# Patient Record
Sex: FEMALE | Race: BLACK OR AFRICAN AMERICAN | NOT HISPANIC OR LATINO | URBAN - METROPOLITAN AREA
[De-identification: names, ages, dates, MRNs, and addresses within clinical notes are randomized per-mention and may not be internally consistent; named-entity substitution may affect disease eponyms.]

---

## 2017-10-13 ENCOUNTER — EMERGENCY (EMERGENCY)
Facility: HOSPITAL | Age: 57
LOS: 1 days | End: 2017-10-13
Payer: COMMERCIAL

## 2017-10-13 PROCEDURE — 71020: CPT | Mod: 26

## 2017-10-13 PROCEDURE — 99285 EMERGENCY DEPT VISIT HI MDM: CPT

## 2017-10-13 PROCEDURE — 71275 CT ANGIOGRAPHY CHEST: CPT | Mod: 26

## 2017-10-14 ENCOUNTER — OUTPATIENT (OUTPATIENT)
Dept: OUTPATIENT SERVICES | Facility: HOSPITAL | Age: 57
LOS: 1 days | End: 2017-10-14

## 2017-11-14 ENCOUNTER — EMERGENCY (EMERGENCY)
Facility: HOSPITAL | Age: 57
LOS: 1 days | End: 2017-11-14
Payer: COMMERCIAL

## 2017-11-14 PROCEDURE — 74177 CT ABD & PELVIS W/CONTRAST: CPT | Mod: 26

## 2017-11-14 PROCEDURE — 99285 EMERGENCY DEPT VISIT HI MDM: CPT

## 2018-06-22 ENCOUNTER — APPOINTMENT (OUTPATIENT)
Dept: ORTHOPEDIC SURGERY | Facility: CLINIC | Age: 58
End: 2018-06-22
Payer: MEDICARE

## 2018-06-22 VITALS
SYSTOLIC BLOOD PRESSURE: 125 MMHG | WEIGHT: 258 LBS | HEART RATE: 73 BPM | DIASTOLIC BLOOD PRESSURE: 74 MMHG | BODY MASS INDEX: 38.21 KG/M2 | HEIGHT: 69 IN

## 2018-06-22 PROCEDURE — 73502 X-RAY EXAM HIP UNI 2-3 VIEWS: CPT

## 2018-06-22 PROCEDURE — 99203 OFFICE O/P NEW LOW 30 MIN: CPT

## 2018-06-22 PROCEDURE — 99214 OFFICE O/P EST MOD 30 MIN: CPT

## 2018-08-05 ENCOUNTER — TRANSCRIPTION ENCOUNTER (OUTPATIENT)
Age: 58
End: 2018-08-05

## 2019-01-17 ENCOUNTER — APPOINTMENT (OUTPATIENT)
Dept: NEUROSURGERY | Facility: CLINIC | Age: 59
End: 2019-01-17
Payer: COMMERCIAL

## 2019-01-17 VITALS
BODY MASS INDEX: 33.33 KG/M2 | WEIGHT: 225 LBS | DIASTOLIC BLOOD PRESSURE: 75 MMHG | HEIGHT: 69 IN | HEART RATE: 99 BPM | SYSTOLIC BLOOD PRESSURE: 142 MMHG

## 2019-01-17 PROCEDURE — 99204 OFFICE O/P NEW MOD 45 MIN: CPT

## 2019-01-17 NOTE — PLAN
[FreeTextEntry1] : DIAGNOSIS:  CERVICAL SPONDYLOSIS with MYELOPATHY \par TREATMENT PLAN:  NON-SURGICAL   VS.   ACDF  C3-6\par \par This is a patient with cervical spondylosis and stenosis.  I have recommended nonsurgical management at this time.  This consists of physical therapy and/or manual medicine in conjunction to medical therapy and other conservative methods.  These include the consideration of trigger point injections and or the utilization of modalities such as TENS where applicable.  The next tier would be the referral to a pain management specialist (anesthesia or physiatry) for the consideration of spinal injections.  This includes the options of epidural steroids, facet injections as well as other novel techniques that may provide pain relief.  Although there is indeed evidence of disk degeneration on imaging, discectomy or spinal fusion for the sole purposes of treating neck pain in the absence of a compressive lesion or neurological issue is associated with poor outcomes.   \par \par If all  nonsurgical methods fail, and/or the patient develops neurologic issues then, I have recommended cervical decompression and fusion as a treatment option.  This entails removing the disk, osteophytes and the ventral uncovertebral joints along with the underlying hypertrophied/ossified posterior longitudinal ligamentum.  This will allow for a widening of the spinal canal and the neuroforamen at the effected levels.  In doing so this will likely result in added instability to the spine at this level requiring the need for instrumented stabilization and fusion.  This implies the use of anterior plate fixation and interbody prosthetics to provide strength and anatomical alignment to the operated segments.  \par \par Risks and benefits of surgery were described to the patient in detail which include but not excluding those otherwise not mentioned:  coma paralysis, death, stroke, spinal fluid leak, nerve injury, weakness, infection, hardware malfunction/failure/mal-positioning requiring re-operation, vascular injury, nonunion/pseudoarthrosis, adjacent segment degeneration, dysphonia/dysphagia and persistent pain.\par \par I have reviewed the images in detail with the patient today in my office and have answered all questions regarding this condition to the best of my ability to the patient’s satisfaction.\par \par \par Need bone density\par \par Lumbar MRI\par \par

## 2019-01-17 NOTE — RESULTS/DATA
[FreeTextEntry1] : Mitali MRI reveals severe stenosis at C3-4 with spinal cord signal changes at 3445 also 6 with stenosis at all these levels is a bulging disc at C6-7.  X-rays in thre is subluxation which is  a little hypermobile but not unstable in flexion extension views.

## 2019-01-17 NOTE — HISTORY OF PRESENT ILLNESS
[de-identified] : Jessica is a pleasant 58-year-old female for evaluation of her cervical spine today. She is a history of rheumatoid arthritis has been on steroids for many years. She has history of multiple orthopedic surgeries to the hips and knees. She been using a walker for several years now.  She now has numbness and tingling in her hands and weakness in her arms. She has weakness in her legs as per her report. 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  She states that she gets cysts and all of her joints and had apparently some sort of the lumbar systems per history years ago the cause for her left leg weakness and never fully return to normal. She did not have surgery at that time. She doesn't complaining of severe neck pain and diminished range of motion. She has bursitis of the reminiscent of myelopathy. Her neurologist recommended a cervical spine MRI and she's here for review of this.

## 2019-01-17 NOTE — PHYSICAL EXAM
[FreeTextEntry6] : She has trace proximal weakness in the upper tremor is but has good  strength. She has numbness and tingling down the arms bilaterally. Motor examination was notable for some weakness in the left leg compared the right with mild atrophy of the quadriceps. She's got trace weakness of dorsiflexion in the left leg.

## 2019-02-03 ENCOUNTER — EMERGENCY (EMERGENCY)
Facility: HOSPITAL | Age: 59
LOS: 1 days | End: 2019-02-03
Payer: COMMERCIAL

## 2019-02-03 PROCEDURE — 70450 CT HEAD/BRAIN W/O DYE: CPT | Mod: 26

## 2019-02-03 PROCEDURE — 72125 CT NECK SPINE W/O DYE: CPT | Mod: 26

## 2019-02-03 PROCEDURE — 73030 X-RAY EXAM OF SHOULDER: CPT | Mod: 26,RT

## 2019-02-03 PROCEDURE — 99284 EMERGENCY DEPT VISIT MOD MDM: CPT

## 2019-02-06 ENCOUNTER — APPOINTMENT (OUTPATIENT)
Dept: NEUROSURGERY | Facility: CLINIC | Age: 59
End: 2019-02-06
Payer: COMMERCIAL

## 2019-02-06 VITALS — WEIGHT: 225 LBS | BODY MASS INDEX: 34.1 KG/M2 | HEIGHT: 68 IN

## 2019-02-06 VITALS — SYSTOLIC BLOOD PRESSURE: 138 MMHG | DIASTOLIC BLOOD PRESSURE: 80 MMHG

## 2019-02-06 DIAGNOSIS — M48.02 SPINAL STENOSIS, CERVICAL REGION: ICD-10-CM

## 2019-02-06 PROCEDURE — 99214 OFFICE O/P EST MOD 30 MIN: CPT

## 2019-02-06 NOTE — REASON FOR VISIT
[Follow-Up: _____] : a [unfilled] follow-up visit [FreeTextEntry1] : Mrs. Oconnor presents to the office today in follow-up after she was seen in the ED at [facility] on 2/3 after she sustained a mechanical slip and fall down several stairs and striking her head on a metal bar at home. A CT scan of her cervical spine was performed which revealed multi-level degenerative disc disease, diffuse spondylosis with severe central canal stenosis at C4-5, C5-6 and C6-7. A CT scan of her brain was performed which was negative for ICH and she was discharged home as having had a concussion. She is currently under the care of Dr. Wallace for pain management and notes that he decreased the dose of her Fentanyl patch and oxycodone. She is currently taking oxycodone 20mg five times daily.

## 2019-02-06 NOTE — RESULTS/DATA
[FreeTextEntry1] : CT c-spine personally reviewed. It reveals an anterolisthesis at C3-4 with resulting central canal stenosis. There is diffuse spondylosis and multi-level degenerative disc disease most notable at C4-5 and C5-6. There is significant central canal stenosis at the aforementioned levels. \par \par Compared images to most recent MRI which did not suggest an acute injury/new findings.

## 2019-02-13 RX ORDER — DICLOFENAC SODIUM 10 MG/G
1 GEL TOPICAL
Qty: 1 | Refills: 1 | Status: ACTIVE | COMMUNITY
Start: 2019-02-13 | End: 1900-01-01

## 2019-03-04 ENCOUNTER — APPOINTMENT (OUTPATIENT)
Dept: NEUROSURGERY | Facility: CLINIC | Age: 59
End: 2019-03-04
Payer: MEDICARE

## 2019-03-04 VITALS — WEIGHT: 225 LBS | HEIGHT: 68 IN | BODY MASS INDEX: 34.1 KG/M2

## 2019-03-04 VITALS — SYSTOLIC BLOOD PRESSURE: 122 MMHG | DIASTOLIC BLOOD PRESSURE: 70 MMHG

## 2019-03-04 DIAGNOSIS — Z91.81 HISTORY OF FALLING: ICD-10-CM

## 2019-03-04 PROCEDURE — 99214 OFFICE O/P EST MOD 30 MIN: CPT

## 2019-03-04 NOTE — RESULTS/DATA
[FreeTextEntry1] : ZP  MRI of the Cervical spine is again reviewed. It shows rather severe cervical stenosis from C3-C6 with spinal cord signal changes at the upper level. C3-4 is the most stenotic level. Lumbar spine imaging reveals L2-3 L3-4 stenosis with a mild spondylolisthesis noted.

## 2019-03-04 NOTE — ASSESSMENT
[FreeTextEntry1] : DIAGNOSIS:  CERVICAL STENOSIS MYELOPATHY\par TREATMENT PLAN:  CERVICAL  DECOMPRESSION AND FUSION  C3-6\par \par This is a patient with cervical spondylosis and stenosis.  I have recommended cervical decompression and fusion as a treatment option.  This entails removing the disk, osteophytes and the ventral uncovertebral joints along with the underlying hypertrophied/ossified posterior longitudinal ligamentum.  This will allow for a widening of the spinal canal and the neuroforamen at the effected levels.  In doing so this will likely result in added instability to the spine at this level requiring the need for instrumented stabilization and fusion.  This implies the use of anterior plate fixation and interbody prosthetics to provide strength and anatomical alignment to the operated segments.  \par \par Risks and benefits of surgery were described to the patient in detail which include but not excluding those otherwise not mentioned:  coma paralysis, death, stroke, spinal fluid leak, nerve injury, weakness, infection, hardware malfunction/failure/mal-positioning requiring re-operation, vascular injury, nonunion/pseudoarthrosis, adjacent segment degeneration, dysphonia/dysphagia and persistent pain.\par \par Prior to surgery she needs a full medical clearance I think vascular studies and lower extremities would be appropriate.\par \par In reference to her lumbar spine she does indeed have lumbar stenosis may be a candidate for surgical intervention but the cervical spine is the priority at this point.\par

## 2019-03-04 NOTE — REASON FOR VISIT
[Follow-Up: _____] : a [unfilled] follow-up visit [FreeTextEntry1] : \par \par Shasta is here for followup regarding her cervical spine primarily. She is imaging also her lumbar spine. She symptoms suggestive of a myeloradiculopathy. She has severe neck and arm pain with numbness and tingling in hands. She states she took a fall recently, it seemed to result in worsening arm pain and numbness and tingling bilaterally. In speaking with her she also states that she has a feeling of coldness in her feet as well as numbness and tingling.  She walks with the aid of a walker. She has bilateral knee replacements.

## 2019-03-19 ENCOUNTER — OUTPATIENT (OUTPATIENT)
Dept: OUTPATIENT SERVICES | Facility: HOSPITAL | Age: 59
LOS: 1 days | End: 2019-03-19

## 2019-03-28 ENCOUNTER — INPATIENT (INPATIENT)
Facility: HOSPITAL | Age: 59
LOS: 0 days | Discharge: HOME | End: 2019-03-29
Attending: NEUROLOGICAL SURGERY | Admitting: NEUROLOGICAL SURGERY
Payer: MEDICARE

## 2019-03-28 ENCOUNTER — APPOINTMENT (OUTPATIENT)
Dept: NEUROSURGERY | Facility: HOSPITAL | Age: 59
End: 2019-03-28
Payer: MEDICARE

## 2019-03-28 ENCOUNTER — OUTPATIENT (OUTPATIENT)
Dept: OUTPATIENT SERVICES | Facility: HOSPITAL | Age: 59
LOS: 1 days | End: 2019-03-28

## 2019-03-28 PROCEDURE — 22554 ARTHRD ANT NTRBD MIN DSC CRV: CPT

## 2019-03-28 PROCEDURE — 22585 ARTHRD ANT NTRBD MIN DSC EA: CPT

## 2019-03-28 PROCEDURE — 63082 REMOVE VERTEBRAL BODY ADD-ON: CPT

## 2019-03-28 PROCEDURE — 22853 INSJ BIOMECHANICAL DEVICE: CPT | Mod: 59

## 2019-03-28 PROCEDURE — 63081 REMOVE VERT BODY DCMPRN CRVL: CPT | Mod: 22

## 2019-03-28 PROCEDURE — 22846 INSERT SPINE FIXATION DEVICE: CPT | Mod: 59

## 2019-03-28 PROCEDURE — 22551 ARTHRD ANT NTRBDY CERVICAL: CPT

## 2019-03-28 PROCEDURE — 72040 X-RAY EXAM NECK SPINE 2-3 VW: CPT | Mod: 26

## 2019-03-28 PROCEDURE — 22552 ARTHRD ANT NTRBD CERVICAL EA: CPT

## 2019-03-30 ENCOUNTER — MOBILE ON CALL (OUTPATIENT)
Age: 59
End: 2019-03-30

## 2019-04-01 ENCOUNTER — APPOINTMENT (OUTPATIENT)
Dept: NEUROSURGERY | Facility: CLINIC | Age: 59
End: 2019-04-01
Payer: MEDICARE

## 2019-04-01 ENCOUNTER — EMERGENCY (EMERGENCY)
Facility: HOSPITAL | Age: 59
LOS: 1 days | End: 2019-04-01
Admitting: EMERGENCY MEDICINE
Payer: MEDICARE

## 2019-04-01 VITALS — BODY MASS INDEX: 34.1 KG/M2 | HEIGHT: 68 IN | WEIGHT: 225 LBS

## 2019-04-01 PROCEDURE — 99283 EMERGENCY DEPT VISIT LOW MDM: CPT

## 2019-04-01 PROCEDURE — 99024 POSTOP FOLLOW-UP VISIT: CPT

## 2019-04-01 RX ORDER — OXYCODONE AND ACETAMINOPHEN 10; 325 MG/1; MG/1
10-325 TABLET ORAL
Qty: 42 | Refills: 0 | Status: ACTIVE | COMMUNITY
Start: 2019-04-01 | End: 1900-01-01

## 2019-04-01 NOTE — REASON FOR VISIT
[de-identified] : Anterior cervical decompression C3-C6 [de-identified] : 3/28/19 [de-identified] : Patient is in a lot of pain- she says her shoulders are in severe pain along with her back.

## 2019-04-01 NOTE — ASSESSMENT
[FreeTextEntry1] : Mrs. Oconnor is 4 days post-op from an anterior cervical decompression and fusion C3-6. She is complaining of severe right sided neck and arm pain. She has been taking the Percocet 20mg tablets, 2 every 4 hours or so, with little relief. We advised her that we can only prescribe the Pecocet 10mg tablets for a week at a time and she should follow-up with Dr. Wallace should she require more pain medication. I did advise her that she should follow-up with a pain management specialist to work on tapering down her medication. We will follow-up with her in a week to check her wound and evaluate her progress.

## 2019-04-01 NOTE — PHYSICAL EXAM
[General Appearance - Alert] : alert [General Appearance - In No Acute Distress] : in no acute distress [General Appearance - Well Nourished] : well nourished [General Appearance - Well Developed] : well developed [General Appearance - Well-Appearing] : healthy appearing [] : normal voice and communication [Transverse] : transverse [Clean] : clean [Dry] : dry [Intact] : intact [Abnormal Walk] : normal gait [Limited Balance] : the patient's balance was impaired [FreeTextEntry1] : anterior neck  [FreeTextEntry6] : + strength intact bilaterally

## 2019-04-09 ENCOUNTER — APPOINTMENT (OUTPATIENT)
Dept: NEUROSURGERY | Facility: CLINIC | Age: 59
End: 2019-04-09
Payer: MEDICARE

## 2019-04-09 VITALS
SYSTOLIC BLOOD PRESSURE: 165 MMHG | HEART RATE: 100 BPM | BODY MASS INDEX: 36.37 KG/M2 | DIASTOLIC BLOOD PRESSURE: 94 MMHG | WEIGHT: 240 LBS | HEIGHT: 68 IN

## 2019-04-09 PROCEDURE — 99024 POSTOP FOLLOW-UP VISIT: CPT

## 2019-04-09 NOTE — HISTORY OF PRESENT ILLNESS
[FreeTextEntry1] : Mrs. Oconnor presents to the office today 11 days post-op from a C3-C6 ACDF. She does report some improvement in the strength and sensation in her hands bilaterally. She is still experiencing pain in the back of her neck and to the right shoulder/arm. She is still ambulating with the walker. Her incision has healed beautifully without evidence of infection. She is still taking Oxycodone 30mg 5 times daily.

## 2019-04-09 NOTE — ASSESSMENT
[FreeTextEntry1] : Mrs. Oconnor is now 11 days post-op from a three level anterior cervical decompression and fusion. She should work on increasing her activity as tolerated. I've provided her a script for PT to work on strengthening her upper extremities and work on her range of motion. Her incision is healing well, but I've advised her not to submerge the wound in water or apply any ointment to the wound until it has completely healed. She may continue with ice to the neck for the swelling and heat to the back of the neck to help with muscle spasms. She should follow-up with Dr. Wallace regarding her pain medication as she should work on tapering down the medication. She is to follow-up in the office in about a month and at that time we will obtain a set of cervical xrays.

## 2019-04-09 NOTE — REASON FOR VISIT
[de-identified] : post Anterior cervical decompression C3-C6 [de-identified] : 3/28/19 [de-identified] : Patient is here for follow up - back of neck pain and severe pain in the side of her neck. She has numbness in her legs and feet.

## 2019-04-19 ENCOUNTER — APPOINTMENT (OUTPATIENT)
Dept: NEUROSURGERY | Facility: CLINIC | Age: 59
End: 2019-04-19
Payer: MEDICARE

## 2019-04-19 VITALS
SYSTOLIC BLOOD PRESSURE: 152 MMHG | WEIGHT: 240 LBS | DIASTOLIC BLOOD PRESSURE: 82 MMHG | BODY MASS INDEX: 36.37 KG/M2 | HEIGHT: 68 IN

## 2019-04-19 PROCEDURE — 99024 POSTOP FOLLOW-UP VISIT: CPT

## 2019-04-19 RX ORDER — CYCLOBENZAPRINE HYDROCHLORIDE 10 MG/1
10 TABLET, FILM COATED ORAL TWICE DAILY
Qty: 40 | Refills: 0 | Status: ACTIVE | COMMUNITY
Start: 2019-04-19 | End: 1900-01-01

## 2019-04-19 NOTE — DATA REVIEWED
[de-identified] : cervical xrays performed in the office today reveal interbody spacers in place at C3-4, C4-5 and C5-6 with evidence of early fusion in the spaces. There is an atlantis plate atop with screws in the vertebral bodies, good alignment.

## 2019-04-19 NOTE — DATA REVIEWED
[de-identified] : cervical xrays performed in the office today reveal interbody spacers in place at C3-4, C4-5 and C5-6 with evidence of early fusion in the spaces. There is an atlantis plate atop with screws in the vertebral bodies, good alignment.

## 2019-04-19 NOTE — HISTORY OF PRESENT ILLNESS
[FreeTextEntry1] : Mrs. Oconnor presents to the office today three weeks status post C3-6 anterior cervical decompression fusion. She is still complaining of pain in her right arm, but does report improvement in sensation in her bilateral upper and lower extremities. The strength in her arms has also improved significantly. She is walking with a rollator, but does report improvement in her gait and balance. She is still taking fentanyl 75mcg every 12 hours and Percocet 30mg 4 times daily. \par

## 2019-04-19 NOTE — REASON FOR VISIT
[Follow-Up: _____] : a [unfilled] follow-up visit [FreeTextEntry1] : having a lot of pain  [de-identified] : 3/28/2019 [de-identified] : Anterior cervical decompression fusion C3-6

## 2019-04-19 NOTE — PHYSICAL EXAM
[General Appearance - Alert] : alert [General Appearance - In No Acute Distress] : in no acute distress [General Appearance - Well Nourished] : well nourished [General Appearance - Well Developed] : well developed [General Appearance - Well-Appearing] : healthy appearing [] : normal voice and communication [Transverse] : transverse [Clean] : clean [Dry] : dry [Well-Healed] : well-healed [Intact] : intact [No Drainage] : without drainage [Person] : oriented to person [Time] : oriented to time [Place] : oriented to place [Involuntary Movements] : no involuntary movements were seen [Motor Tone] : muscle tone was normal in all four extremities [No Muscle Atrophy] : normal bulk in all four extremities [Motor Handedness Right-Handed] : the patient is right hand dominant [Abnormal Walk] : normal gait [Limited Balance] : the patient's balance was impaired [4] : 4/5 Finger Flexors (C8) [5] : 5/5 Finger Flexors (C8) [FreeTextEntry1] : anterior neck

## 2019-04-19 NOTE — ASSESSMENT
[FreeTextEntry1] : Mrs. Oconnor is three weeks status post C3-6 ACDF, and although she is experiencing a lot of post surgical pain, her gait and strength seemed to have improved significantly. I've recommended massage therapy to help alleviate the tension in the back of her neck. I've also recommended applying heat and biofreeze to the neck. She should continue with PT to work on her balance and strengthening her arms. She has some weakness to her right shoulder, which is likely due to her RA. I've also referred her to a pain management specialist to better manage her pain. We will have her back in the office in three months for another set of cervical xrays.

## 2019-04-19 NOTE — REASON FOR VISIT
[Follow-Up: _____] : a [unfilled] follow-up visit [FreeTextEntry1] : having a lot of pain  [de-identified] : Anterior cervical decompression fusion C3-6 [de-identified] : 3/28/2019

## 2019-04-19 NOTE — PHYSICAL EXAM
[General Appearance - Alert] : alert [General Appearance - In No Acute Distress] : in no acute distress [General Appearance - Well Nourished] : well nourished [General Appearance - Well Developed] : well developed [General Appearance - Well-Appearing] : healthy appearing [] : normal voice and communication [Transverse] : transverse [Clean] : clean [Dry] : dry [Intact] : intact [Well-Healed] : well-healed [No Drainage] : without drainage [Person] : oriented to person [Time] : oriented to time [Place] : oriented to place [Involuntary Movements] : no involuntary movements were seen [Motor Tone] : muscle tone was normal in all four extremities [Motor Handedness Right-Handed] : the patient is right hand dominant [No Muscle Atrophy] : normal bulk in all four extremities [Limited Balance] : the patient's balance was impaired [Abnormal Walk] : normal gait [4] : 4/5 Finger Flexors (C8) [5] : 5/5 Elbow Extensor (C7) [FreeTextEntry1] : anterior neck

## 2019-04-28 ENCOUNTER — OUTPATIENT (OUTPATIENT)
Dept: OUTPATIENT SERVICES | Facility: HOSPITAL | Age: 59
LOS: 1 days | End: 2019-04-28

## 2019-05-17 ENCOUNTER — APPOINTMENT (OUTPATIENT)
Age: 59
End: 2019-05-17
Payer: MEDICARE

## 2019-05-17 VITALS
WEIGHT: 240 LBS | DIASTOLIC BLOOD PRESSURE: 85 MMHG | HEIGHT: 68 IN | BODY MASS INDEX: 36.37 KG/M2 | SYSTOLIC BLOOD PRESSURE: 152 MMHG | HEART RATE: 90 BPM

## 2019-05-17 PROCEDURE — 99024 POSTOP FOLLOW-UP VISIT: CPT

## 2019-05-17 NOTE — PHYSICAL EXAM
[General Appearance - Alert] : alert [General Appearance - Well Nourished] : well nourished [General Appearance - In No Acute Distress] : in no acute distress [General Appearance - Well-Appearing] : healthy appearing [General Appearance - Well Developed] : well developed [] : normal voice and communication [Transverse] : transverse [Clean] : clean [Dry] : dry [Well-Healed] : well-healed [Intact] : intact [Person] : oriented to person [Time] : oriented to time [Place] : oriented to place [Motor Tone] : muscle tone was normal in all four extremities [Motor Strength] : muscle strength was normal in all four extremities [Involuntary Movements] : no involuntary movements were seen [No Muscle Atrophy] : normal bulk in all four extremities [Motor Handedness Right-Handed] : the patient is right hand dominant [Abnormal Walk] : normal gait [Limited Balance] : the patient's balance was impaired [Able to heel walk] : the patient was able to heel walk [Normal] : normal [Able to toe walk] : the patient was able to toe walk [5] : 5/5 Elbow Extensor (C7) [3] : 3/5 Knee Extensor (L3) [4] : 4/5 Great Toe Extension (L5) [FreeTextEntry8] : walks well without any assistance [FreeTextEntry1] : anterior neck

## 2019-05-17 NOTE — ASSESSMENT
[FreeTextEntry1] : Mrs. Oconnor is two months status post C3-C6 anterior cervical decompression and fusion. She is doing extremely well and is to continue to increase her activity as tolerated. I;ve advised her to start ambulating without the walker and use the cane for assistance. I've also suggested she use ankle weights to work on strengthening her legs while walking and doing exercises at home. She really should work on tapering down this medication at this post, down to oxycodone 30mg TID. We will have her back in the office in three months to repeat the xrays and discuss the merits of a possible lumbar fusion surgery.

## 2019-05-17 NOTE — HISTORY OF PRESENT ILLNESS
[FreeTextEntry1] : Mrs. Oconnor presents to the office today almost two months status post ACDF C3-6. Her surgical pain has improved significantly and is only experiencing some pain in the back of her head. She is moving around much better and has started to use the cane particularly at home and with short distances. She reports increased sensation to her upper extremities bilaterally. She does report heaviness to her legs and back pain and would like to revisit the lumbar spine MRI at some point. \par She saw Dr. Wallace last week who increased her oxycodone from 20mg 5 times daily to 30mg QID. She is still on fentanyl 75mcg.

## 2019-05-17 NOTE — DATA REVIEWED
[de-identified] : Reviewed previous lumbar spine MRI  from  (2/19): L3-4 slight grade 1 anterolisthesis and broad based disc bulge and bilateral facet arthropathy resulting in moderate to severe central canal stenosis. At L5-S1 there is a broad based disc bulge with bilateral facet arthropathy resulting in mild central canal and bilateral foraminal stenosis, left greater than right with impingement of the exiting L5 nerve root.

## 2019-05-17 NOTE — REASON FOR VISIT
[de-identified] : status post Anterior cervical decompression fusion C3-6  [de-identified] : 3/28/2019 [de-identified] : she says her pain is better then from before surgery. she is having some back of the head pain but she says that’s from when she hit her head before surgery.

## 2019-05-27 NOTE — REASON FOR VISIT
[New Patient Visit] : a new patient visit [FreeTextEntry1] : Patient is here for lower back-upper back- neck pain- Imaging was done at [facility].  [phone]

## 2019-05-28 ENCOUNTER — APPOINTMENT (OUTPATIENT)
Dept: NEUROSURGERY | Facility: CLINIC | Age: 59
End: 2019-05-28
Payer: MEDICARE

## 2019-05-28 VITALS
DIASTOLIC BLOOD PRESSURE: 82 MMHG | WEIGHT: 240 LBS | BODY MASS INDEX: 36.37 KG/M2 | HEIGHT: 68 IN | HEART RATE: 103 BPM | SYSTOLIC BLOOD PRESSURE: 131 MMHG

## 2019-05-28 PROCEDURE — 99024 POSTOP FOLLOW-UP VISIT: CPT

## 2019-05-28 NOTE — HISTORY OF PRESENT ILLNESS
[FreeTextEntry1] : Mrs. Oconnor presents to the office today with complaints of left sided neck pain which has been going on since she was seen here ten days ago, but worse in the past two days. She thinks she may have slept the wrong way. She has been taking the cyclobenzaprine with little to no relief. She denies any radicular symptoms. Her surgical pain has improved at this point.

## 2019-05-28 NOTE — ASSESSMENT
[FreeTextEntry1] : Mrs. Oconnor presents to the office today with left sided paraspinal neck pain. She is moving the neck well and does not have any radicular symptoms at this time. I've advised her that her pain could be secondary from positioning from surgical, but likely due to sleeping in the wrong position. She should continue with the cyclobenzaprine twice daily as needed for muscle spasm. She should apply heat to the back of the neck and consider massage. If her symptoms do not improve then she can follow-up with [facility] for a trigger point injection.

## 2019-05-28 NOTE — PHYSICAL EXAM
[General Appearance - In No Acute Distress] : in no acute distress [General Appearance - Alert] : alert [General Appearance - Well Developed] : well developed [General Appearance - Well Nourished] : well nourished [General Appearance - Well-Appearing] : healthy appearing [] : normal voice and communication [Person] : oriented to person [Time] : oriented to time [Place] : oriented to place [Involuntary Movements] : no involuntary movements were seen [Motor Strength] : muscle strength was normal in all four extremities [Motor Tone] : muscle tone was normal in all four extremities [Motor Handedness Right-Handed] : the patient is right hand dominant [No Muscle Atrophy] : normal bulk in all four extremities [Abnormal Walk] : normal gait [Limited Balance] : the patient's balance was impaired [Normal] : normal [Able to toe walk] : the patient was able to toe walk [Able to heel walk] : the patient was able to heel walk [5] : 5/5 Finger Flexors (C8) [3] : 3/5 Knee Extensor (L3) [4] : 4/5 Ankle Plantar Flexion (S1) [FreeTextEntry1] : well appearing [FreeTextEntry8] : walks well without any assistance [Left Trapezius Muscle] : left trapezius muscle [Left  Paraspinal ___ (level)] : ~Ulevel [unfilled] left paraspinal

## 2019-05-28 NOTE — REASON FOR VISIT
[de-identified] : 3/28/2019 [de-identified] : post Anterior cervical decompression fusion C3-6  [de-identified] : patient is here for a follow up- she is having severe pain starting into the back of her neck and into the back of her head- she claims she took a fall back in February- she had  CT scan done at [facility] [place] which they stated she has a concussion.

## 2019-06-11 ENCOUNTER — TRANSCRIPTION ENCOUNTER (OUTPATIENT)
Age: 59
End: 2019-06-11

## 2019-07-10 ENCOUNTER — APPOINTMENT (OUTPATIENT)
Dept: NEUROSURGERY | Facility: CLINIC | Age: 59
End: 2019-07-10

## 2019-07-16 ENCOUNTER — OUTPATIENT (OUTPATIENT)
Dept: OUTPATIENT SERVICES | Facility: HOSPITAL | Age: 59
LOS: 1 days | End: 2019-07-16

## 2019-07-25 ENCOUNTER — EMERGENCY (EMERGENCY)
Facility: HOSPITAL | Age: 59
LOS: 1 days | End: 2019-07-25
Admitting: EMERGENCY MEDICINE
Payer: MEDICARE

## 2019-07-25 PROCEDURE — 99284 EMERGENCY DEPT VISIT MOD MDM: CPT

## 2019-07-25 PROCEDURE — 70450 CT HEAD/BRAIN W/O DYE: CPT | Mod: 26

## 2019-08-16 ENCOUNTER — APPOINTMENT (OUTPATIENT)
Dept: NEUROSURGERY | Facility: CLINIC | Age: 59
End: 2019-08-16
Payer: MEDICARE

## 2019-08-16 PROCEDURE — 99214 OFFICE O/P EST MOD 30 MIN: CPT

## 2019-08-16 NOTE — PHYSICAL EXAM
[General Appearance - In No Acute Distress] : in no acute distress [General Appearance - Alert] : alert [General Appearance - Well Nourished] : well nourished [General Appearance - Well Developed] : well developed [] : normal voice and communication [General Appearance - Well-Appearing] : healthy appearing [Person] : oriented to person [Place] : oriented to place [Time] : oriented to time [Motor Tone] : muscle tone was normal in all four extremities [No Muscle Atrophy] : normal bulk in all four extremities [Involuntary Movements] : no involuntary movements were seen [Motor Strength] : muscle strength was normal in all four extremities [Motor Handedness Right-Handed] : the patient is right hand dominant [Limited Balance] : the patient's balance was impaired [Abnormal Walk] : normal gait [Left  Paraspinal ___ (level)] : ~Ulevel [unfilled] left paraspinal [Normal] : normal [Left Trapezius Muscle] : left trapezius muscle [Able to heel walk] : the patient was able to heel walk [3] : 3/5 Knee Extensor (L3) [5] : 5/5 Finger Flexors (C8) [4] : 4/5 Ankle Plantar Flexion (S1) [Intact] : all sensory within normal limits bilaterally [FreeTextEntry1] : well appearing [FreeTextEntry6] : +ambulating with walker [FreeTextEntry8] : walks well without any assistance

## 2019-08-16 NOTE — ASSESSMENT
[FreeTextEntry1] : Mrs. Oconnor presents to the office today 5 months status post ACDF C3-6. She has fused and doing well in terms of the neck pain. She does also report improvement in her myelopathy symptoms. She is still experiencing symptoms suggestive of neurogenic claudication. Her lumbar spine MRI reveals stenosis at L3-4. She would like to discuss a lumbar laminectomy with possible fusion. I've advised her to follow-up with us/Dr. Hammer to discuss the merits of surgery in further detail. She should continue with the pain medication as prescribed.

## 2019-08-16 NOTE — RESULTS/DATA
[FreeTextEntry1] : Cervical xrays performed in the office reveal anterior cervical hardware intact from C3-6 with fusion among the intervertebral spaces at C3-4, C4-5 and C5-6.

## 2019-08-16 NOTE — HISTORY OF PRESENT ILLNESS
[FreeTextEntry1] : Mrs. Oconnor presents to the office today 5 months status post anterior cervical decompression and fusion C3-6. She is doing well terms of her neck and has no more neck pain. She does still have some residual hand numbness. She has been seeing Dr. Koo for her shoulders as she has significant tears in the rotator cuff muscles bilaterally. She is still on the fentanyl 75mcg every 3 days and oxycodone as prescribed by Dr. Wallace.\par \par She is still experiencing leg pain/heaviness and feels as though she has "ace bandages wrapped around her legs."

## 2019-08-27 ENCOUNTER — APPOINTMENT (OUTPATIENT)
Dept: NEUROSURGERY | Facility: CLINIC | Age: 59
End: 2019-08-27
Payer: MEDICARE

## 2019-08-27 VITALS
SYSTOLIC BLOOD PRESSURE: 138 MMHG | BODY MASS INDEX: 37.89 KG/M2 | WEIGHT: 250 LBS | DIASTOLIC BLOOD PRESSURE: 58 MMHG | HEIGHT: 68 IN

## 2019-08-27 PROCEDURE — 99214 OFFICE O/P EST MOD 30 MIN: CPT

## 2019-08-27 NOTE — HISTORY OF PRESENT ILLNESS
[FreeTextEntry1] : Mrs. Oconnor presents to the office today to discuss surgery on her lumbar spine. She is still experiencing heaviness and weakness to her legs bilaterally and having a difficult time walking long distances. She is walking with the assistance of a walker. She is unable to go to PT due to financial issues. She is otherwise moving around pretty well. Her cervical spine has healed beautifully at 5 months post-op.

## 2019-08-27 NOTE — RESULTS/DATA
[FreeTextEntry1] : MRI l-spine personally reviewed by Dr. Hammer this morning. MRI ([facility] 2/19) At L2-3 there is a broad based disc bulge and bilateral facet arthropathy resulting in mild central canal stenosis and bilateral foraminal narrowing, left worse than right. At L3-4 there is a slight grade 1 spondylolisthesis, with severe bilateral facet arthropathy and ligamentous hypertrophy resulting in moderate central canal stenosis. \par \par Full report within EMR

## 2019-08-27 NOTE — PHYSICAL EXAM
[General Appearance - Alert] : alert [General Appearance - In No Acute Distress] : in no acute distress [General Appearance - Well Nourished] : well nourished [General Appearance - Well-Appearing] : healthy appearing [General Appearance - Well Developed] : well developed [] : normal voice and communication [Time] : oriented to time [Place] : oriented to place [Person] : oriented to person [Motor Strength] : muscle strength was normal in all four extremities [Motor Tone] : muscle tone was normal in all four extremities [Involuntary Movements] : no involuntary movements were seen [Abnormal Walk] : normal gait [Motor Handedness Right-Handed] : the patient is right hand dominant [No Muscle Atrophy] : normal bulk in all four extremities [Limited Balance] : the patient's balance was impaired [Left  Paraspinal ___ (level)] : ~Ulevel [unfilled] left paraspinal [Left Trapezius Muscle] : left trapezius muscle [Normal] : normal [Able to heel walk] : the patient was able to heel walk [5] : 5/5 Finger Flexors (C8) [3] : 3/5 Knee Extensor (L3) [4] : 4/5 Ankle Plantar Flexion (S1) [Intact] : all sensory within normal limits bilaterally [FreeTextEntry6] : +ambulating with walker [FreeTextEntry8] : walks well without any assistance

## 2019-08-27 NOTE — ASSESSMENT
[FreeTextEntry1] : At this time we advised against a decompressive laminectomy and fusion. Her MRI does reveal some degree of stenosis, however not severe enough that we feel she would benefit from the procedure. Given her history of RA and the need for high dose of pain medication we feel that the surgery would leave her with more pain. I've advised her to continue exercising and working on her posture, core strength and leg strength. I've also provided her a script for pain management. If she is still symptomatic after 6 months to a year then we can repeat the MRI and re-evaluate her.

## 2019-08-30 ENCOUNTER — TRANSCRIPTION ENCOUNTER (OUTPATIENT)
Age: 59
End: 2019-08-30

## 2019-09-20 ENCOUNTER — EMERGENCY (EMERGENCY)
Facility: HOSPITAL | Age: 59
LOS: 1 days | End: 2019-09-20
Payer: MEDICARE

## 2019-09-20 PROCEDURE — 71046 X-RAY EXAM CHEST 2 VIEWS: CPT | Mod: 26

## 2019-09-20 PROCEDURE — 71275 CT ANGIOGRAPHY CHEST: CPT | Mod: 26

## 2019-09-20 PROCEDURE — 99285 EMERGENCY DEPT VISIT HI MDM: CPT

## 2019-09-21 PROCEDURE — 93010 ELECTROCARDIOGRAM REPORT: CPT

## 2019-11-12 ENCOUNTER — EMERGENCY (EMERGENCY)
Facility: HOSPITAL | Age: 59
LOS: 1 days | End: 2019-11-12
Admitting: EMERGENCY MEDICINE
Payer: MEDICARE

## 2019-11-12 PROCEDURE — 99284 EMERGENCY DEPT VISIT MOD MDM: CPT

## 2019-11-12 PROCEDURE — 71046 X-RAY EXAM CHEST 2 VIEWS: CPT | Mod: 26

## 2019-11-12 PROCEDURE — 74176 CT ABD & PELVIS W/O CONTRAST: CPT | Mod: 26

## 2020-02-20 ENCOUNTER — INPATIENT (INPATIENT)
Facility: HOSPITAL | Age: 60
LOS: 0 days | Discharge: HOME | End: 2020-02-21
Payer: MEDICARE

## 2020-02-20 ENCOUNTER — OUTPATIENT (OUTPATIENT)
Dept: OUTPATIENT SERVICES | Facility: HOSPITAL | Age: 60
LOS: 1 days | End: 2020-02-20

## 2020-02-20 PROCEDURE — 71045 X-RAY EXAM CHEST 1 VIEW: CPT | Mod: 26

## 2020-02-20 PROCEDURE — 93970 EXTREMITY STUDY: CPT | Mod: 26

## 2020-02-20 PROCEDURE — 99285 EMERGENCY DEPT VISIT HI MDM: CPT

## 2020-02-21 PROCEDURE — 76770 US EXAM ABDO BACK WALL COMP: CPT | Mod: 26

## 2020-02-21 PROCEDURE — 76856 US EXAM PELVIC COMPLETE: CPT | Mod: 26

## 2020-02-21 PROCEDURE — 71275 CT ANGIOGRAPHY CHEST: CPT | Mod: 26

## 2020-08-23 NOTE — PHYSICAL EXAM
Daily Rounds:    Pt with elevated anxiety  Received PRN Ativan yesterday afternoon  Denies SI/HI/AVH  Appears to have slept ok  [General Appearance - Alert] : alert [General Appearance - In No Acute Distress] : in no acute distress [General Appearance - Well Nourished] : well nourished [General Appearance - Well Developed] : well developed [General Appearance - Well-Appearing] : healthy appearing [] : normal voice and communication [Person] : oriented to person [Place] : oriented to place [Time] : oriented to time [Motor Tone] : muscle tone was normal in all four extremities [Involuntary Movements] : no involuntary movements were seen [No Muscle Atrophy] : normal bulk in all four extremities [Motor Handedness Left-Handed] : the patient is left hand dominant [Limited Balance] : the patient's balance was impaired [5] : C8 finger flexors 5/5 [4] : T1 abductor digiti minimi 4/5 [FreeTextEntry6] : She has trace proximal weakness in the upper extremity is but has good  strength. She has numbness and tingling down the arms bilaterally. Motor examination was notable for some weakness in the left leg compared the right with mild atrophy of the quadriceps. She's got trace weakness of dorsiflexion in the left leg. [FreeTextEntry8] : +ambulating with walker [Spurling's Same Side] : Positive Spurling's on same side [Deformity] : no deformity [Erythema] : no erythema [Ecchymosis] : no ecchymosis [Swelling] : no swelling [Right Paraspinal ___ (level)] : ~Ulevel [unfilled] right paraspinal [Right Trapezius Muscle] : right trapezius muscle

## 2020-09-04 ENCOUNTER — EMERGENCY (EMERGENCY)
Facility: HOSPITAL | Age: 60
LOS: 1 days | End: 2020-09-04
Payer: MEDICARE

## 2020-09-04 PROCEDURE — 71045 X-RAY EXAM CHEST 1 VIEW: CPT | Mod: 26

## 2020-09-04 PROCEDURE — 93010 ELECTROCARDIOGRAM REPORT: CPT

## 2020-09-04 PROCEDURE — 99285 EMERGENCY DEPT VISIT HI MDM: CPT

## 2020-09-09 ENCOUNTER — APPOINTMENT (OUTPATIENT)
Dept: NEUROSURGERY | Facility: CLINIC | Age: 60
End: 2020-09-09
Payer: MEDICARE

## 2020-09-09 PROCEDURE — 99215 OFFICE O/P EST HI 40 MIN: CPT

## 2020-09-09 NOTE — PHYSICAL EXAM
[General Appearance - Alert] : alert [General Appearance - Well Developed] : well developed [General Appearance - Well Nourished] : well nourished [General Appearance - In No Acute Distress] : in no acute distress [General Appearance - Well-Appearing] : healthy appearing [Person] : oriented to person [Motor Tone] : muscle tone was normal in all four extremities [Place] : oriented to place [Time] : oriented to time [No Muscle Atrophy] : normal bulk in all four extremities [Involuntary Movements] : no involuntary movements were seen [Motor Strength] : muscle strength was normal in all four extremities [Motor Handedness Right-Handed] : the patient is right hand dominant [Limited Balance] : the patient's balance was impaired [FreeTextEntry1] : obese [FreeTextEntry6] : +ambulating with rollator

## 2020-09-09 NOTE — RESULTS/DATA
[FreeTextEntry1] : MRI l-spine ( 5/20/2020) was personally reviewed with the patient in the office today. At L2-3 there is a superimposed disc herniation extruding superiorly above the margin of the disc space and bilateral facet arthropathy resulting in severe central canal stenosis and bilateral foraminal stenosis. There is also a grade 1 spondylolisthesis at this level. At L3-4 there is a broad based disc bulge and facet arthropathy resulting in moderate to severe central canal stenosis.

## 2020-09-09 NOTE — ASSESSMENT
[FreeTextEntry1] : Counseled patient on smoking cessation and weight loss prior to any surgical intervention. Also advised patient that hgbA1c must be under 8 prior to surgery. She is understanding of this and will follow-up accordingly. \par \par DIAGNOSIS:  LUMBAR STENOSIS with NEUROGENIC CLAUDICATION, SPONDYLOLISTHESIS WITH SEVERE STENOSIS  L2-3 and L3-4 \par TREATMENT PLAN:  LUMBAR DECOMPRESSION with  STABILIZATION AND FUSION L2-4 vs. CONSERVATIVE METHODS\par \par This is a patient with severe bilateral facet arthropathy, a left paracentral disc herniation, and a grade 1 spondylolisthesis at L2-3 as well as facet arthropathy at L3-4 resulting in severe central canal stenosis causing symptoms suggestive of neurogenic claudication. She has undergone conservative management including injections with some relief. She is, however, reluctant to undergo any surgery. If she does not get adequate relief with the injections and is still rather symptomatic then she is a good candidate for a decompressive laminectomy with possible fusion. This entails removing the lamina and possibly removing the medial facet joints along with the underlying hypertrophied ligamentum flavum.  This will allow for a widening of the spinal canal and the neuroforamen at the effected levels.  In doing so may create instability to the spine (at this level) requiring the need for instrumented stabilization and fusion.  This implies the use of pedicle screws and possibly interbody prosthetics to provide strength and anatomical alignment to the operated segments.  \par  \par Risks and benefits of surgery were described to the patient in detail which include but not excluding those otherwise not mentioned:  coma paralysis, death, stroke, spinal fluid leak, nerve injury, weakness, infection, hardware malfunction/failure/malpositioning requiring re-operation, vascular injury, nonunion/pseudoarthrosis, adjacent segment degeneration, dysphonia/dysphagia and persistent pain.

## 2020-09-09 NOTE — REVIEW OF SYSTEMS
[As Noted in HPI] : as noted in HPI [Tingling] : tingling [Numbness] : numbness [Negative] : Constitutional

## 2020-09-10 VITALS
WEIGHT: 250 LBS | DIASTOLIC BLOOD PRESSURE: 64 MMHG | SYSTOLIC BLOOD PRESSURE: 96 MMHG | BODY MASS INDEX: 37.89 KG/M2 | HEIGHT: 68 IN | HEART RATE: 103 BPM

## 2020-10-05 ENCOUNTER — OUTPATIENT (OUTPATIENT)
Dept: OUTPATIENT SERVICES | Facility: HOSPITAL | Age: 60
LOS: 1 days | End: 2020-10-05

## 2021-03-11 NOTE — HISTORY OF PRESENT ILLNESS
Current Issues/Problems reviewed on call: Asthma, HTN, Back Pain, Covid Vaccine    Details for Interventions/Education completed on call:  Asthma evaluation completed today. Reviewed hypertension-blood pressures have been stable-patient is keeping record, back pain-doing better after injection, and discussed Nirali's inability to secure vaccine.  CM will attempt to sign Nirali up  for vaccine at [facility] if possible.    Screening completed for  COVID -19 using the [facility] Symptom . Screening is Negative for symptoms    Time Frame for Follow up:  Within within 4 weeks    Plan for Next Call:3/31/21    Care Plan reviewed with Patient  and she agrees with the plan of care and the call follow up plan.            
[FreeTextEntry1] : Mrs. Oconnor presents to the office with complaints of persistent back pain, leg heaviness and difficulty walking long distances. She is still ambulating with a rollator. She has been decreased in the dosage of her oxycodone and states she has been experiencing more pain. She is supposed to have a right shoulder replacement, but Dr. CHRIS won't do surgery unless patient can ambulate without the rollator. She was recently in the hospital last week for dehydration, hyperglycemia and a near syncopal episode.

## 2021-04-06 ENCOUNTER — APPOINTMENT (OUTPATIENT)
Dept: NEUROSURGERY | Facility: CLINIC | Age: 61
End: 2021-04-06
Payer: MEDICARE

## 2021-04-06 VITALS
HEART RATE: 100 BPM | SYSTOLIC BLOOD PRESSURE: 130 MMHG | HEIGHT: 68 IN | BODY MASS INDEX: 37.13 KG/M2 | WEIGHT: 245 LBS | DIASTOLIC BLOOD PRESSURE: 73 MMHG

## 2021-04-06 PROCEDURE — 99213 OFFICE O/P EST LOW 20 MIN: CPT

## 2021-04-06 PROCEDURE — 99072 ADDL SUPL MATRL&STAF TM PHE: CPT

## 2021-04-06 NOTE — REASON FOR VISIT
[Follow-Up: _____] : a [unfilled] follow-up visit [FreeTextEntry1] : \par Shasta is here for clinical reevaluation. She got rather severe lumbar stenosis L2-4. She walks with a walker. She has had imaging since April 2020. She has chronic pain. She has shoulder issues that require surgical intervention additionally. She has chronic pain is managed by her neurologist and pain management specialist however she is concerned that her pain management is currently not providing adequate relief.  \par \par \par neuro  Pflaster\par PM  Vallaincourt\par ortho   Olujimi\par

## 2021-04-06 NOTE — ASSESSMENT
[FreeTextEntry1] : \par This is a patient of lumbar stenosis who is status post cervical discectomy and fusion. She also has bilateral shoulder issues that may require orthopedic intervention specifically. My point of view I recommended a new MRI of the lumbar spine so we did review whether or not surgical intervention would be appropriate for the lumbar issue.\par \par She is quite dependent on significant pain medication and is under the care of neurology and pain management. I've encouraged her to remain under the care of specific providers as I will not be providing any nonsurgical pain management.\par \par She will follow up with the orthopedic surgeon accordingly as needed similarly.\par

## 2021-04-06 NOTE — RESULTS/DATA
[FreeTextEntry1] : \juan f [facility] images in 2020 were reviewed showing lumbar spinal stenosis L2-L4 with a grade 1 spondylolisthesis.

## 2021-04-26 ENCOUNTER — APPOINTMENT (OUTPATIENT)
Dept: OBGYN | Facility: CLINIC | Age: 61
End: 2021-04-26
Payer: MEDICARE

## 2021-04-26 VITALS
BODY MASS INDEX: 36.88 KG/M2 | HEIGHT: 67 IN | DIASTOLIC BLOOD PRESSURE: 90 MMHG | SYSTOLIC BLOOD PRESSURE: 150 MMHG | WEIGHT: 235 LBS

## 2021-04-26 DIAGNOSIS — Z01.419 ENCOUNTER FOR GYNECOLOGICAL EXAMINATION (GENERAL) (ROUTINE) W/OUT ABNORMAL FINDINGS: ICD-10-CM

## 2021-04-26 DIAGNOSIS — Z12.39 ENCOUNTER FOR OTHER SCREENING FOR MALIGNANT NEOPLASM OF BREAST: ICD-10-CM

## 2021-04-26 DIAGNOSIS — N63.10 UNSPECIFIED LUMP IN THE RIGHT BREAST, UNSPECIFIED QUADRANT: ICD-10-CM

## 2021-04-26 DIAGNOSIS — Z12.4 ENCOUNTER FOR SCREENING FOR MALIGNANT NEOPLASM OF CERVIX: ICD-10-CM

## 2021-04-26 DIAGNOSIS — N76.0 ACUTE VAGINITIS: ICD-10-CM

## 2021-04-26 PROCEDURE — 99386 PREV VISIT NEW AGE 40-64: CPT

## 2021-04-26 PROCEDURE — 99072 ADDL SUPL MATRL&STAF TM PHE: CPT

## 2021-04-26 RX ORDER — FENTANYL 75 UG/H
75 PATCH, EXTENDED RELEASE TRANSDERMAL
Qty: 10 | Refills: 0 | Status: COMPLETED | COMMUNITY
Start: 2021-04-07

## 2021-04-26 NOTE — PLAN
[FreeTextEntry1] : thin prep with hr hpv\par gc/ct\par affirm\par Screening mammogram\par follow up Gyn as needed

## 2021-04-26 NOTE — PHYSICAL EXAM
[Appropriately responsive] : appropriately responsive [Alert] : alert [No Acute Distress] : no acute distress [No Lymphadenopathy] : no lymphadenopathy [Regular Rate Rhythm] : regular rate rhythm [No Murmurs] : no murmurs [Clear to Auscultation B/L] : clear to auscultation bilaterally [Tender] : tender [Soft] : soft [Non-tender] : non-tender [Non-distended] : non-distended [No Mass] : no mass [FreeTextEntry6] : right breast with palpable cystic/glandular lesions  upper/outer quadrant and medial to hartmann  no nipple discharge   tender to palpation [Examination Of The Breasts] : a normal appearance [___cm] : a ~M [unfilled] ~Ucm inferior medial quadrant mass was palpated [Breast Mass Left Breast ___cm] : no mass was palpable [No Lesions] : no lesions  [Labia Majora] : normal [Labia Minora] : normal [Pink Rugae] : pink rugae [No Bleeding] : There was no active vaginal bleeding [Normal] : normal [Normal Position] : in a normal position [Tenderness] : nontender [Uterine Adnexae] : normal [Declined] : Patient declined rectal exam

## 2021-04-30 LAB
C TRACH RRNA SPEC QL NAA+PROBE: NOT DETECTED
CANDIDA VAG CYTO: DETECTED
CYTOLOGY CVX/VAG DOC THIN PREP: NORMAL
G VAGINALIS+PREV SP MTYP VAG QL MICRO: DETECTED
HPV HIGH+LOW RISK DNA PNL CVX: DETECTED
N GONORRHOEA RRNA SPEC QL NAA+PROBE: NOT DETECTED
SOURCE TP AMPLIFICATION: NORMAL
T VAGINALIS VAG QL WET PREP: NOT DETECTED

## 2021-04-30 RX ORDER — FLUCONAZOLE 150 MG/1
150 TABLET ORAL
Qty: 1 | Refills: 2 | Status: ACTIVE | COMMUNITY
Start: 2021-04-30 | End: 1900-01-01

## 2021-04-30 RX ORDER — METRONIDAZOLE 7.5 MG/G
0.75 GEL VAGINAL
Qty: 70 | Refills: 1 | Status: ACTIVE | COMMUNITY
Start: 2021-04-30 | End: 1900-01-01

## 2021-05-03 ENCOUNTER — NON-APPOINTMENT (OUTPATIENT)
Age: 61
End: 2021-05-03

## 2021-05-04 ENCOUNTER — APPOINTMENT (OUTPATIENT)
Dept: NEUROSURGERY | Facility: CLINIC | Age: 61
End: 2021-05-04
Payer: MEDICARE

## 2021-05-04 VITALS — HEIGHT: 67 IN | WEIGHT: 235 LBS | BODY MASS INDEX: 36.88 KG/M2

## 2021-05-04 PROCEDURE — 99213 OFFICE O/P EST LOW 20 MIN: CPT

## 2021-05-04 PROCEDURE — 99072 ADDL SUPL MATRL&STAF TM PHE: CPT

## 2021-05-04 RX ORDER — DICLOFENAC SODIUM 50 MG/1
50 TABLET, DELAYED RELEASE ORAL DAILY
Qty: 60 | Refills: 0 | Status: ACTIVE | COMMUNITY
Start: 2021-05-04 | End: 1900-01-01

## 2021-05-04 NOTE — ASSESSMENT
[FreeTextEntry1] : \par \par This is a patient with lumbar stenosis and spondylolisthesis L2-3 and L3-4. Discussed many treatment options with the patient in detail including nonsurgical methods. Also discussed the consideration for spinal cord stimulator trial. Abdomen referrals for this resource prior to considering open decompression with fusion. She is amenable to this plan and will come back to me if he does stimulator is a worker she's not a candidate for it. I believe that lumbar decompression fusion may be an option however if something safer and less invasive can be offered I think she would do better overall. Things change he'll have to see her back in the office.

## 2021-05-04 NOTE — RESULTS/DATA
[FreeTextEntry1] : [facility] MRI shows grade 1 spondylolisthesis at L2-3 L3-4 with stenosis at each of these levels.

## 2021-05-04 NOTE — REASON FOR VISIT
[Follow-Up: _____] : a [unfilled] follow-up visit [FreeTextEntry1] : \juan f [place] is here for imaging followup. She still walks with a walker. She still has signs and symptoms of neurogenic claudication axial back pain. She has new MRIs and lumbar spine done at [facility]. She is here to discuss the option for surgery in the lumbar region.

## 2021-05-05 ENCOUNTER — EMERGENCY (EMERGENCY)
Facility: HOSPITAL | Age: 61
LOS: 1 days | End: 2021-05-05
Admitting: EMERGENCY MEDICINE
Payer: MEDICARE

## 2021-05-05 PROCEDURE — 71045 X-RAY EXAM CHEST 1 VIEW: CPT | Mod: 26

## 2021-05-05 PROCEDURE — 93010 ELECTROCARDIOGRAM REPORT: CPT

## 2021-05-05 PROCEDURE — 70450 CT HEAD/BRAIN W/O DYE: CPT | Mod: 26

## 2021-05-05 PROCEDURE — 71275 CT ANGIOGRAPHY CHEST: CPT | Mod: 26

## 2021-05-05 PROCEDURE — 99285 EMERGENCY DEPT VISIT HI MDM: CPT

## 2021-05-05 PROCEDURE — 93970 EXTREMITY STUDY: CPT | Mod: 26

## 2021-05-07 ENCOUNTER — RESULT REVIEW (OUTPATIENT)
Age: 61
End: 2021-05-07

## 2021-05-07 ENCOUNTER — APPOINTMENT (OUTPATIENT)
Dept: MAMMOGRAPHY | Facility: CLINIC | Age: 61
End: 2021-05-07
Payer: MEDICARE

## 2021-05-07 PROCEDURE — 77063 BREAST TOMOSYNTHESIS BI: CPT

## 2021-05-07 PROCEDURE — 77067 SCR MAMMO BI INCL CAD: CPT

## 2021-07-19 ENCOUNTER — OUTPATIENT (OUTPATIENT)
Dept: OUTPATIENT SERVICES | Facility: HOSPITAL | Age: 61
LOS: 1 days | End: 2021-07-19

## 2021-08-23 ENCOUNTER — TRANSCRIPTION ENCOUNTER (OUTPATIENT)
Age: 61
End: 2021-08-23

## 2021-09-23 ENCOUNTER — OUTPATIENT (OUTPATIENT)
Dept: OUTPATIENT SERVICES | Facility: HOSPITAL | Age: 61
LOS: 1 days | End: 2021-09-23

## 2021-09-30 ENCOUNTER — TRANSCRIPTION ENCOUNTER (OUTPATIENT)
Age: 61
End: 2021-09-30

## 2021-12-05 ENCOUNTER — EMERGENCY (EMERGENCY)
Facility: HOSPITAL | Age: 61
LOS: 1 days | End: 2021-12-05
Admitting: EMERGENCY MEDICINE
Payer: MEDICARE

## 2021-12-05 PROCEDURE — 93010 ELECTROCARDIOGRAM REPORT: CPT

## 2021-12-05 PROCEDURE — 99282 EMERGENCY DEPT VISIT SF MDM: CPT

## 2022-01-21 ENCOUNTER — EMERGENCY (EMERGENCY)
Facility: HOSPITAL | Age: 62
LOS: 1 days | Discharge: HOME | End: 2022-01-21
Admitting: EMERGENCY MEDICINE
Payer: MEDICARE

## 2022-01-21 DIAGNOSIS — M79.7 FIBROMYALGIA: ICD-10-CM

## 2022-01-21 DIAGNOSIS — Z79.82 LONG TERM (CURRENT) USE OF ASPIRIN: ICD-10-CM

## 2022-01-21 DIAGNOSIS — E66.9 OBESITY, UNSPECIFIED: ICD-10-CM

## 2022-01-21 DIAGNOSIS — D64.9 ANEMIA, UNSPECIFIED: ICD-10-CM

## 2022-01-21 DIAGNOSIS — E11.9 TYPE 2 DIABETES MELLITUS WITHOUT COMPLICATIONS: ICD-10-CM

## 2022-01-21 DIAGNOSIS — I25.10 ATHEROSCLEROTIC HEART DISEASE OF NATIVE CORONARY ARTERY WITHOUT ANGINA PECTORIS: ICD-10-CM

## 2022-01-21 DIAGNOSIS — Z96.641 PRESENCE OF RIGHT ARTIFICIAL HIP JOINT: ICD-10-CM

## 2022-01-21 DIAGNOSIS — Z95.5 PRESENCE OF CORONARY ANGIOPLASTY IMPLANT AND GRAFT: ICD-10-CM

## 2022-01-21 DIAGNOSIS — Z79.4 LONG TERM (CURRENT) USE OF INSULIN: ICD-10-CM

## 2022-01-21 DIAGNOSIS — I10 ESSENTIAL (PRIMARY) HYPERTENSION: ICD-10-CM

## 2022-01-21 DIAGNOSIS — Z96.651 PRESENCE OF RIGHT ARTIFICIAL KNEE JOINT: ICD-10-CM

## 2022-01-21 DIAGNOSIS — Z98.1 ARTHRODESIS STATUS: ICD-10-CM

## 2022-01-21 DIAGNOSIS — F17.210 NICOTINE DEPENDENCE, CIGARETTES, UNCOMPLICATED: ICD-10-CM

## 2022-01-21 DIAGNOSIS — Z79.02 LONG TERM (CURRENT) USE OF ANTITHROMBOTICS/ANTIPLATELETS: ICD-10-CM

## 2022-01-21 DIAGNOSIS — R07.9 CHEST PAIN, UNSPECIFIED: ICD-10-CM

## 2022-01-21 PROCEDURE — 99285 EMERGENCY DEPT VISIT HI MDM: CPT

## 2022-01-21 PROCEDURE — 93010 ELECTROCARDIOGRAM REPORT: CPT

## 2022-01-21 PROCEDURE — 71045 X-RAY EXAM CHEST 1 VIEW: CPT | Mod: 26

## 2022-01-22 ENCOUNTER — OUTPATIENT (OUTPATIENT)
Dept: OUTPATIENT SERVICES | Facility: HOSPITAL | Age: 62
LOS: 1 days | End: 2022-01-22

## 2022-03-04 DIAGNOSIS — R07.9 CHEST PAIN, UNSPECIFIED: ICD-10-CM

## 2022-03-14 ENCOUNTER — TRANSCRIPTION ENCOUNTER (OUTPATIENT)
Age: 62
End: 2022-03-14

## 2022-06-22 ENCOUNTER — EMERGENCY (EMERGENCY)
Facility: HOSPITAL | Age: 62
LOS: 1 days | Discharge: HOME | End: 2022-06-22
Admitting: EMERGENCY MEDICINE
Payer: MEDICARE

## 2022-06-22 DIAGNOSIS — I10 ESSENTIAL (PRIMARY) HYPERTENSION: ICD-10-CM
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
## 2022-06-22 DIAGNOSIS — M54.50 LOW BACK PAIN, UNSPECIFIED: ICD-10-CM

## 2022-06-22 DIAGNOSIS — S32.038A OTHER FRACTURE OF THIRD LUMBAR VERTEBRA, INITIAL ENCOUNTER FOR CLOSED FRACTURE: ICD-10-CM
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
## 2022-06-22 DIAGNOSIS — E11.9 TYPE 2 DIABETES MELLITUS WITHOUT COMPLICATIONS: ICD-10-CM

## 2022-06-22 PROCEDURE — 93971 EXTREMITY STUDY: CPT | Mod: 26,LT

## 2022-06-22 PROCEDURE — 99285 EMERGENCY DEPT VISIT HI MDM: CPT

## 2022-06-22 PROCEDURE — 72131 CT LUMBAR SPINE W/O DYE: CPT | Mod: 26,MA

## 2022-06-30 ENCOUNTER — APPOINTMENT (OUTPATIENT)
Dept: NEUROSURGERY | Facility: CLINIC | Age: 62
End: 2022-06-30

## 2022-06-30 VITALS
HEIGHT: 67 IN | HEART RATE: 121 BPM | DIASTOLIC BLOOD PRESSURE: 102 MMHG | BODY MASS INDEX: 36.88 KG/M2 | WEIGHT: 235 LBS | SYSTOLIC BLOOD PRESSURE: 161 MMHG | TEMPERATURE: 96.7 F

## 2022-06-30 DIAGNOSIS — S32.009A UNSPECIFIED FRACTURE OF UNSPECIFIED LUMBAR VERTEBRA, INITIAL ENCOUNTER FOR CLOSED FRACTURE: ICD-10-CM

## 2022-06-30 DIAGNOSIS — M54.9 DORSALGIA, UNSPECIFIED: ICD-10-CM

## 2022-06-30 PROCEDURE — 99213 OFFICE O/P EST LOW 20 MIN: CPT

## 2022-06-30 NOTE — DATA REVIEWED
[de-identified] : Was reviewed of the lumbar spine -6/22/2022: Right-sided lumbar transverse process fracture at L3 [de-identified] : [facility] MRI shows grade 1 spondylolisthesis at L2-3 L3-4 with stenosis at each of these levels.

## 2022-06-30 NOTE — ASSESSMENT
[FreeTextEntry1] : Discussed the history, physical examination findings, and recent imaging with the patient with all questions answered.  The imaging has been reviewed with the patient.  Conservative treatment recommended at this time with rest and modified activity, and medications as prescribed by her pain management doctor.  Discussed that no bracing is required for these injuries.  Formal physical therapy prescription provided.  She may go as needed and as tolerated.  Discussed there is no role for neurosurgical intervention at this time.  The patient will follow up with us as needed.

## 2022-06-30 NOTE — DATA REVIEWED
[de-identified] : Was reviewed of the lumbar spine -6/22/2022: Right-sided lumbar transverse process fracture at L3 [de-identified] : [facility] MRI shows grade 1 spondylolisthesis at L2-3 L3-4 with stenosis at each of these levels.

## 2022-06-30 NOTE — HISTORY OF PRESENT ILLNESS
[FreeTextEntry1] : 62-year-old female presents to the neurosurgery office for follow-up evaluation.  The patient is status post fall which occurred at home.  She was seen in the emergency department at [facility].  CT imaging revealed a right transverse process fracture of the lumbar spine.  The patient was instructed to follow-up in the neurosurgery office.  The patient is known to the office and was seen approximately 1 year ago with the neurosurgeon.  His MRI imaging was reviewed with the patient and conservative versus surgical treatment options were discussed for lumbar stenosis and spondylolisthesis at L2-3 and L3-4.  She is currently seeing a pain management doctor for pain medications.  She complains mainly of right-sided back pain.

## 2022-06-30 NOTE — HISTORY OF PRESENT ILLNESS
[FreeTextEntry1] : 62-year-old female presents to the neurosurgery office for follow-up evaluation.  The patient is status post fall which occurred at home.  She was seen in the emergency department at [facility].  CT imaging revealed a right transverse process fracture of the lumbar spine.  The patient was instructed to follow-up in the neurosurgery office.  The patient is known to the office and was seen approximately 1 year ago with the neurosurgeon.  His MRI imaging was reviewed with the patient and conservative versus surgical treatment options were discussed for lumbar stenosis and spondylolisthesis at L2-3 and L3-4.  She is currently seeing a pain management doctor for pain medications.  She complains mainly of right-sided back pain.

## 2022-08-09 ENCOUNTER — OFFICE (OUTPATIENT)
Dept: URBAN - METROPOLITAN AREA CLINIC 38 | Facility: CLINIC | Age: 62
Setting detail: OPHTHALMOLOGY
End: 2022-08-09
Payer: MEDICARE

## 2022-08-09 DIAGNOSIS — H16.223: ICD-10-CM

## 2022-08-09 DIAGNOSIS — H25.13: ICD-10-CM

## 2022-08-09 DIAGNOSIS — E11.9: ICD-10-CM

## 2022-08-09 DIAGNOSIS — H01.001: ICD-10-CM

## 2022-08-09 PROCEDURE — 92004 COMPRE OPH EXAM NEW PT 1/>: CPT | Performed by: OPHTHALMOLOGY

## 2022-08-09 ASSESSMENT — REFRACTION_MANIFEST
OS_VA2: 20/30(J2)
OS_ADD: +2.50
OS_VA1: 20/40-2
OD_CYLINDER: SPH
OD_SPHERE: +1.50
OD_ADD: +2.50
OU_VA: 20/40-2
OD_VA2: 20/30(J2)
OS_CYLINDER: SPH
OD_VA1: 20/50-
OS_SPHERE: +0.25

## 2022-08-09 ASSESSMENT — KERATOMETRY
OD_AXISANGLE_DEGREES: 039
OS_K1POWER_DIOPTERS: 41.25
OS_K2POWER_DIOPTERS: 41.75
OD_K1POWER_DIOPTERS: 41.00
OD_K2POWER_DIOPTERS: 41.50
OS_AXISANGLE_DEGREES: 142

## 2022-08-09 ASSESSMENT — REFRACTION_AUTOREFRACTION
OS_AXIS: 168
OS_SPHERE: +0.50
OS_CYLINDER: -0.50

## 2022-08-09 ASSESSMENT — REFRACTION_CURRENTRX
OD_OVR_VA: 20/
OS_CYLINDER: SPHERE
OD_SPHERE: +1.00
OS_OVR_VA: 20/
OS_VPRISM_DIRECTION: PROGS
OD_CYLINDER: SPHERE
OS_SPHERE: +0.50
OD_VPRISM_DIRECTION: PROGS
OD_ADD: +2.50
OS_ADD: +2.50

## 2022-08-09 ASSESSMENT — VISUAL ACUITY
OD_BCVA: 20/50
OS_BCVA: 20/60+2

## 2022-08-09 ASSESSMENT — CONFRONTATIONAL VISUAL FIELD TEST (CVF)
OD_FINDINGS: FULL
OS_FINDINGS: FULL

## 2022-08-09 ASSESSMENT — LID EXAM ASSESSMENTS
OD_BLEPHARITIS: RUL 1+
OS_BLEPHARITIS: LUL 1+

## 2022-08-09 ASSESSMENT — TONOMETRY
OS_IOP_MMHG: 14
OD_IOP_MMHG: 14

## 2022-08-09 ASSESSMENT — SUPERFICIAL PUNCTATE KERATITIS (SPK)
OS_SPK: 2+
OD_SPK: 2+

## 2022-08-09 ASSESSMENT — AXIALLENGTH_DERIVED: OS_AL: 24.2468

## 2022-08-09 ASSESSMENT — SPHEQUIV_DERIVED: OS_SPHEQUIV: 0.25

## 2022-09-15 ENCOUNTER — OFFICE (OUTPATIENT)
Dept: URBAN - METROPOLITAN AREA CLINIC 38 | Facility: CLINIC | Age: 62
Setting detail: OPHTHALMOLOGY
End: 2022-09-15
Payer: MEDICARE

## 2022-09-15 DIAGNOSIS — H25.13: ICD-10-CM

## 2022-09-15 DIAGNOSIS — H25.11: ICD-10-CM

## 2022-09-15 PROCEDURE — 92136 OPHTHALMIC BIOMETRY: CPT | Performed by: OPHTHALMOLOGY

## 2022-09-15 PROCEDURE — 99213 OFFICE O/P EST LOW 20 MIN: CPT | Performed by: OPHTHALMOLOGY

## 2022-09-15 ASSESSMENT — TONOMETRY
OS_IOP_MMHG: 10
OD_IOP_MMHG: 10

## 2022-09-15 ASSESSMENT — SUPERFICIAL PUNCTATE KERATITIS (SPK)
OD_SPK: 2+
OS_SPK: 2+

## 2022-09-15 ASSESSMENT — REFRACTION_CURRENTRX
OS_CYLINDER: SPHERE
OD_ADD: +2.50
OS_VPRISM_DIRECTION: PROGS
OD_SPHERE: +1.00
OS_ADD: +2.50
OD_CYLINDER: SPHERE
OD_OVR_VA: 20/
OS_SPHERE: +0.50
OS_OVR_VA: 20/
OD_VPRISM_DIRECTION: PROGS

## 2022-09-15 ASSESSMENT — REFRACTION_AUTOREFRACTION
OS_SPHERE: +0.50
OS_CYLINDER: -0.50
OS_AXIS: 168

## 2022-09-15 ASSESSMENT — REFRACTION_MANIFEST
OU_VA: 20/40-2
OS_SPHERE: +0.25
OD_SPHERE: +1.50
OS_CYLINDER: SPH
OS_VA2: 20/30(J2)
OD_VA2: 20/30(J2)
OS_ADD: +2.50
OD_CYLINDER: SPH
OD_ADD: +2.50
OD_VA1: 20/50-
OS_VA1: 20/40-2

## 2022-09-15 ASSESSMENT — CONFRONTATIONAL VISUAL FIELD TEST (CVF)
OD_FINDINGS: FULL
OS_FINDINGS: FULL

## 2022-09-15 ASSESSMENT — KERATOMETRY
OD_K2POWER_DIOPTERS: 41.50
OS_AXISANGLE_DEGREES: 142
OS_K2POWER_DIOPTERS: 41.75
OD_K1POWER_DIOPTERS: 41.00
OS_K1POWER_DIOPTERS: 41.25
OD_AXISANGLE_DEGREES: 039

## 2022-09-15 ASSESSMENT — LID EXAM ASSESSMENTS
OS_BLEPHARITIS: LUL 1+
OD_BLEPHARITIS: RUL 1+

## 2022-09-15 ASSESSMENT — VISUAL ACUITY
OD_BCVA: 20/50
OS_BCVA: 20/60+2

## 2022-09-15 ASSESSMENT — SPHEQUIV_DERIVED: OS_SPHEQUIV: 0.25

## 2022-09-15 ASSESSMENT — AXIALLENGTH_DERIVED: OS_AL: 24.2468

## 2022-10-11 ENCOUNTER — AMBULATORY SURGERY CENTER (OUTPATIENT)
Dept: URBAN - METROPOLITAN AREA SURGERY 4 | Facility: SURGERY | Age: 62
Setting detail: OPHTHALMOLOGY
End: 2022-10-11
Payer: MEDICARE

## 2022-10-11 DIAGNOSIS — H25.11: ICD-10-CM

## 2022-10-11 PROCEDURE — 66984 XCAPSL CTRC RMVL W/O ECP: CPT | Performed by: OPHTHALMOLOGY

## 2022-10-12 ENCOUNTER — OFFICE (OUTPATIENT)
Dept: URBAN - METROPOLITAN AREA CLINIC 8 | Facility: CLINIC | Age: 62
Setting detail: OPHTHALMOLOGY
End: 2022-10-12
Payer: MEDICARE

## 2022-10-12 ENCOUNTER — RX ONLY (RX ONLY)
Age: 62
End: 2022-10-12

## 2022-10-12 DIAGNOSIS — H25.12: ICD-10-CM

## 2022-10-12 PROCEDURE — 92136 OPHTHALMIC BIOMETRY: CPT | Performed by: OPHTHALMOLOGY

## 2022-10-12 ASSESSMENT — REFRACTION_CURRENTRX
OD_SPHERE: +1.00
OD_OVR_VA: 20/
OS_ADD: +2.50
OS_CYLINDER: SPHERE
OS_SPHERE: +0.50
OS_VPRISM_DIRECTION: PROGS
OS_OVR_VA: 20/
OD_CYLINDER: SPHERE
OD_ADD: +2.50
OD_VPRISM_DIRECTION: PROGS

## 2022-10-12 ASSESSMENT — REFRACTION_MANIFEST
OD_SPHERE: +1.50
OU_VA: 20/40-2
OD_CYLINDER: SPH
OD_VA1: 20/50-
OD_VA2: 20/30(J2)
OS_ADD: +2.50
OS_SPHERE: +0.25
OD_ADD: +2.50
OS_CYLINDER: SPH
OS_VA1: 20/40-2
OS_VA2: 20/30(J2)

## 2022-10-12 ASSESSMENT — VISUAL ACUITY
OD_BCVA: 20/50
OS_BCVA: 20/25-

## 2022-10-12 ASSESSMENT — CONFRONTATIONAL VISUAL FIELD TEST (CVF)
OS_FINDINGS: FULL
OD_FINDINGS: FULL

## 2022-10-12 ASSESSMENT — LID EXAM ASSESSMENTS
OS_BLEPHARITIS: LUL 1+
OD_BLEPHARITIS: RUL 1+

## 2022-10-12 ASSESSMENT — CORNEAL EDEMA CLINICAL DESCRIPTION: OD_CORNEALEDEMA: T @ INCISION

## 2022-10-25 ENCOUNTER — AMBULATORY SURGERY CENTER (OUTPATIENT)
Dept: URBAN - METROPOLITAN AREA SURGERY 4 | Facility: SURGERY | Age: 62
Setting detail: OPHTHALMOLOGY
End: 2022-10-25
Payer: MEDICARE

## 2022-10-25 DIAGNOSIS — H25.12: ICD-10-CM

## 2022-10-25 PROCEDURE — 66984 XCAPSL CTRC RMVL W/O ECP: CPT | Performed by: OPHTHALMOLOGY

## 2022-10-26 ENCOUNTER — RX ONLY (RX ONLY)
Age: 62
End: 2022-10-26

## 2022-10-26 ENCOUNTER — OFFICE (OUTPATIENT)
Dept: URBAN - METROPOLITAN AREA CLINIC 8 | Facility: CLINIC | Age: 62
Setting detail: OPHTHALMOLOGY
End: 2022-10-26

## 2022-10-26 DIAGNOSIS — Z96.1: ICD-10-CM

## 2022-10-26 PROBLEM — H16.223 DRY EYE SYNDROME K SICCA; BOTH EYES: Status: ACTIVE | Noted: 2022-08-09

## 2022-10-26 PROBLEM — H01.004 BLEPHARITIS; RIGHT UPPER LID, LEFT UPPER LID: Status: ACTIVE | Noted: 2022-08-09

## 2022-10-26 PROBLEM — E11.9 DIABETES TYPE 2 NO RETINOPATHY ; BOTH EYES: Status: ACTIVE | Noted: 2022-08-09

## 2022-10-26 PROBLEM — H01.001 BLEPHARITIS; RIGHT UPPER LID, LEFT UPPER LID: Status: ACTIVE | Noted: 2022-08-09

## 2022-10-26 PROCEDURE — 99024 POSTOP FOLLOW-UP VISIT: CPT | Performed by: OPHTHALMOLOGY

## 2022-10-26 ASSESSMENT — REFRACTION_CURRENTRX
OS_CYLINDER: SPHERE
OD_ADD: +2.50
OD_CYLINDER: SPHERE
OD_VPRISM_DIRECTION: PROGS
OS_ADD: +2.50
OS_VPRISM_DIRECTION: PROGS
OD_OVR_VA: 20/
OS_OVR_VA: 20/
OD_SPHERE: +1.00
OS_SPHERE: +0.50

## 2022-10-26 ASSESSMENT — CONFRONTATIONAL VISUAL FIELD TEST (CVF)
OD_FINDINGS: FULL
OS_FINDINGS: FULL

## 2022-10-26 ASSESSMENT — VISUAL ACUITY
OD_BCVA: 20/30-2
OS_BCVA: 20/20-2

## 2022-10-26 ASSESSMENT — REFRACTION_MANIFEST
OS_ADD: +2.50
OD_ADD: +2.50
OS_VA1: 20/40-2
OD_VA1: 20/50-
OS_SPHERE: +0.25
OD_CYLINDER: SPH
OD_VA2: 20/30(J2)
OS_CYLINDER: SPH
OS_VA2: 20/30(J2)
OU_VA: 20/40-2
OD_SPHERE: +1.50

## 2022-10-26 ASSESSMENT — LID EXAM ASSESSMENTS
OD_BLEPHARITIS: RUL 1+
OS_BLEPHARITIS: LUL 1+

## 2022-10-26 ASSESSMENT — CORNEAL EDEMA CLINICAL DESCRIPTION: OS_CORNEALEDEMA: T @ INCISION

## 2022-11-10 ENCOUNTER — APPOINTMENT (OUTPATIENT)
Dept: NEUROSURGERY | Facility: CLINIC | Age: 62
End: 2022-11-10

## 2022-11-10 VITALS
BODY MASS INDEX: 36.88 KG/M2 | HEART RATE: 111 BPM | WEIGHT: 235 LBS | TEMPERATURE: 94.8 F | HEIGHT: 67 IN | DIASTOLIC BLOOD PRESSURE: 64 MMHG | SYSTOLIC BLOOD PRESSURE: 104 MMHG

## 2022-11-10 DIAGNOSIS — F17.210 NICOTINE DEPENDENCE, CIGARETTES, UNCOMPLICATED: ICD-10-CM

## 2022-11-10 PROCEDURE — 99213 OFFICE O/P EST LOW 20 MIN: CPT

## 2022-11-10 NOTE — DATA REVIEWED
[de-identified] : Was reviewed of the lumbar spine -6/22/2022: Right-sided lumbar transverse process fracture at L3 [de-identified] : [facility] MRI shows grade 1 spondylolisthesis at L2-3 L3-4 with stenosis at each of these levels. [de-identified] : Were reviewed of the lumbar spine/pelvis: No acute fracture/dislocation; evidence of previous right total hip arthroplasty; grade 1 spondylolisthesis L2-3 and L3-4 with degenerative disc disease

## 2022-11-10 NOTE — HISTORY OF PRESENT ILLNESS
[FreeTextEntry1] : 62-year-old female presents to the neurosurgery office for evaluation of acute low back pain and acute right lower extremity radicular symptoms.  The patient is known to the office and was last seen in June 2022 because of a right sided lumbar transverse process fracture at L3.  She has known grade 1 spondylolisthesis at L2-3 and L3-4 noted from previous anger [facility] imaging.  The patient reports that 2 weeks ago a spinal cord stimulator was placed by Dr. Medrano and removed with worsening back and leg symptoms stating that she has severe pain that radiates into the right lower extremity.  She is here today finding it difficult to find a comfortable position and ambulates hunched over with the aid of a rolling walker.  She does take pain medications including oxycodone and fentanyl patches as per her pain management doctor.

## 2022-11-10 NOTE — REASON FOR VISIT
[Follow-Up: _____] : a [unfilled] follow-up visit [FreeTextEntry1] : PT is here for lower back pain into right leg.

## 2022-11-10 NOTE — PHYSICAL EXAM
[Oriented To Time, Place, And Person] : oriented to person, place, and time [Impaired Insight] : insight and judgment were intact [Person] : oriented to person [Place] : oriented to place [Time] : oriented to time [FreeTextEntry1] : Acute distress noted with the patient's inability to sit properly in the chair; sitting on the left side of her body.  Examination difficult due to her current discomfort [FreeTextEntry8] : Patient ambulates with a leaned over/hunched gait

## 2022-11-10 NOTE — ASSESSMENT
[FreeTextEntry1] : Discussed the history, physical examination findings, and recent radiographs with the patient with all questions answered.  The patient demonstrates acute back pain and lumbar radiculopathy.  An MRI has been ordered for further evaluation.  She can continue to take her medications as prescribed by her pain management doctor.  May consider steroids, however the patient states that she is already taking steroids and already has history of known diabetes.  Recommend rest and modified activity into the next office visit.  The patient may follow-up at the next office visit with the neurosurgeon for review of the imaging and discussion of treatment plan.

## 2022-11-15 ENCOUNTER — NON-APPOINTMENT (OUTPATIENT)
Age: 62
End: 2022-11-15

## 2022-12-15 ENCOUNTER — OFFICE (OUTPATIENT)
Dept: URBAN - METROPOLITAN AREA CLINIC 38 | Facility: CLINIC | Age: 62
Setting detail: OPHTHALMOLOGY
End: 2022-12-15
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
## 2022-12-15 PROCEDURE — NO SHOW FE NO SHOW FEE: Performed by: OPHTHALMOLOGY

## 2022-12-22 ENCOUNTER — RX ONLY (RX ONLY)
Age: 62
End: 2022-12-22

## 2022-12-22 ENCOUNTER — OFFICE (OUTPATIENT)
Dept: URBAN - METROPOLITAN AREA CLINIC 38 | Facility: CLINIC | Age: 62
Setting detail: OPHTHALMOLOGY
End: 2022-12-22
Payer: MEDICARE

## 2022-12-22 DIAGNOSIS — H16.223: ICD-10-CM

## 2022-12-22 PROCEDURE — 99213 OFFICE O/P EST LOW 20 MIN: CPT | Performed by: OPHTHALMOLOGY

## 2022-12-22 ASSESSMENT — REFRACTION_MANIFEST
OD_SPHERE: PLANO
OS_SPHERE: +0.50
OD_VA1: 20/20-
OD_ADD: +2.50
OD_VA2: 20/25(J1)
OD_AXIS: 105
OS_VA1: 20/20
OS_VA2: 20/25(J1)
OS_CYLINDER: SPH
OD_CYLINDER: -0.50
OS_ADD: +2.50
OU_VA: 20/20

## 2022-12-22 ASSESSMENT — SPHEQUIV_DERIVED: OD_SPHEQUIV: 0.125

## 2022-12-22 ASSESSMENT — REFRACTION_AUTOREFRACTION
OS_CYLINDER: -0.25
OD_SPHERE: +0.50
OD_AXIS: 102
OD_CYLINDER: -0.75
OS_SPHERE: PLANO
OS_AXIS: 034

## 2022-12-22 ASSESSMENT — KERATOMETRY
OS_K1POWER_DIOPTERS: 41.50
OD_K2POWER_DIOPTERS: 41.25
OD_K1POWER_DIOPTERS: 41.00
OS_AXISANGLE_DEGREES: 097
OS_K2POWER_DIOPTERS: 42.25
OD_AXISANGLE_DEGREES: 036

## 2022-12-22 ASSESSMENT — TEAR BREAK UP TIME (TBUT)
OS_TBUT: 6-8 SEC
OD_TBUT: 6-8 SEC

## 2022-12-22 ASSESSMENT — REFRACTION_CURRENTRX
OD_OVR_VA: 20/
OD_CYLINDER: SPHERE
OS_OVR_VA: 20/
OS_ADD: +2.50
OD_SPHERE: +1.00
OD_VPRISM_DIRECTION: PROGS
OD_ADD: +2.50
OS_CYLINDER: SPHERE
OS_SPHERE: +0.50
OS_VPRISM_DIRECTION: PROGS

## 2022-12-22 ASSESSMENT — CONFRONTATIONAL VISUAL FIELD TEST (CVF)
OS_FINDINGS: FULL
OD_FINDINGS: FULL

## 2022-12-22 ASSESSMENT — LID EXAM ASSESSMENTS
OD_BLEPHARITIS: RUL 1+
OS_BLEPHARITIS: LUL 1+

## 2022-12-22 ASSESSMENT — TONOMETRY
OD_IOP_MMHG: 12
OS_IOP_MMHG: 12

## 2022-12-22 ASSESSMENT — VISUAL ACUITY
OS_BCVA: 20/25-
OD_BCVA: 20/20-2

## 2022-12-22 ASSESSMENT — AXIALLENGTH_DERIVED: OD_AL: 24.4454

## 2023-04-11 ENCOUNTER — APPOINTMENT (OUTPATIENT)
Dept: NEUROSURGERY | Facility: CLINIC | Age: 63
End: 2023-04-11
Payer: MEDICARE

## 2023-04-11 DIAGNOSIS — M54.9 DORSALGIA, UNSPECIFIED: ICD-10-CM

## 2023-04-11 PROCEDURE — 72100 X-RAY EXAM L-S SPINE 2/3 VWS: CPT

## 2023-04-11 PROCEDURE — 99213 OFFICE O/P EST LOW 20 MIN: CPT

## 2023-04-11 NOTE — PHYSICAL EXAM
[Antalgic] : antalgic [Intact] : all motor groups within normal limits of strength and tone bilaterally [Able to toe walk] : the patient was not able to toe walk [Able to heel walk] : the patient was not able to heel walk

## 2023-04-11 NOTE — REASON FOR VISIT
[Follow-Up: _____] : a [unfilled] follow-up visit [FreeTextEntry1] : \par Jessica is here for evaluation of her lumbar spine.  She had a anterior cervical decompression and fusion in the distant past and now has severe lumbar stenosis neurogenic claudication spondylolisthesis.  She been treated conservatively including pain management with the stimulator trial with no results and would like to discuss the surgical options.  She comes in with [facility] MRI update and we got x-rays today in the office.  She has symptoms of neurogenic claudication axial back pain radiculopathy along with combination.  She ambulates with a walker with a significantly forward posture.\par \par \par \par \par \par PCP  Lucretia Arthur\par Cardio [place]

## 2023-04-11 NOTE — ASSESSMENT
[FreeTextEntry1] : DIAGNOSIS:  SPONDYLOLISTHESIS L2-L3 4 with severe stenosis L2-S1\par TREATMENT PLAN:  LUMBAR DECOMPRESSION WITH INSTRUMENTED STABILIZATION AND FUSION L2-5/S1\par \par This is a patient with lumbar spondylolisthesis.  She has severe stenosis at L2-3-4 with spondylolisthesis at these levels.  There is severe stenosis throughout and down to L5-S1 actually although the joint appears to be more stable.\par \par I have recommended lumbar decompression as a treatment option.  This entails removing the lamina and the medial facet joints along with the underlying hypertrophied ligamentum flavum.  This will allow for a widening of the spinal canal and the neuroforamen at the effected levels.  In doing so will likely result in added instability to the spine at this level requiring the need for instrumented stabilization and fusion.  This implies the use of pedicle screws and possibly interbody prosthetics to provide strength and anatomical alignment to the operated segments.  \par  \par Risks and benefits of surgery were described to the patient in detail which include but not excluding those otherwise not mentioned:  coma paralysis, death, stroke, spinal fluid leak, nerve injury, weakness, infection, hardware malfunction/failure/mal-positioning requiring re-operation, vascular injury, nonunion/pseudoarthrosis, adjacent segment degeneration, dysphonia/dysphagia and persistent pain.

## 2023-04-11 NOTE — RESULTS/DATA
Ways to lower triglycerides  Cut simple sugars out of your diet (white breads, candies, cookies, cakes, etc.)  Reduce or eliminate your intake of vegetable fats and highly processed trans fatty acids.   Exercise 30 minutes a day for 4-5 days a week.   Consider taking the Omega 3 supplement.  Eat more fiber.    Start amlodipine 2.5mg by mouth daily.    Please send a week or two of blood pressure readings after you have been on the amlodipine for 2 weeks.  
[FreeTextEntry1] : [facility] MRI was reviewed in detail.  Patient ID [id number] MRI lumbar spine done April 5, 2023 was reviewed showing severe stenosis from L2-5/S1.  She has grade 1 spondylolisthesis at L2-3 L3-4 significantly.  She has spondyloarthropathy around the lumbar spine and bulging disc at the L5-S1 level.\par \par X-rays done in the office show fair alignment in the AP plane with severe spondyloarthropathy at the L2 334 levels.  Lateral view shows the spondylolisthesis with essentially bone-on-bone at L2-3.  She has a right hip arthroplasty that is also notable.  This interpretation is limited due to the axial spinal skeleton down to the sacrum.

## 2023-07-27 ENCOUNTER — APPOINTMENT (OUTPATIENT)
Dept: RHEUMATOLOGY | Facility: CLINIC | Age: 63
End: 2023-07-27

## 2023-10-09 DIAGNOSIS — Z01.818 ENCOUNTER FOR OTHER PREPROCEDURAL EXAMINATION: ICD-10-CM

## 2023-10-09 RX ORDER — NITROGLYCERIN 0.4 MG/1
0.4 TABLET SUBLINGUAL
Refills: 0 | Status: ACTIVE | COMMUNITY

## 2023-10-09 RX ORDER — NORTRIPTYLINE HYDROCHLORIDE 25 MG/1
25 CAPSULE ORAL
Qty: 60 | Refills: 0 | Status: DISCONTINUED | COMMUNITY
Start: 2021-04-12 | End: 2023-10-09

## 2023-10-09 RX ORDER — ELECTROLYTES/DEXTROSE
SOLUTION, ORAL ORAL
Refills: 0 | Status: ACTIVE | COMMUNITY

## 2023-10-09 RX ORDER — FOLIC ACID 1 MG/1
1 TABLET ORAL
Refills: 0 | Status: ACTIVE | COMMUNITY

## 2023-10-09 RX ORDER — METFORMIN HYDROCHLORIDE 500 MG/1
500 TABLET, COATED ORAL
Refills: 0 | Status: ACTIVE | COMMUNITY

## 2023-10-09 RX ORDER — METOPROLOL SUCCINATE 25 MG/1
25 TABLET, EXTENDED RELEASE ORAL
Qty: 270 | Refills: 0 | Status: DISCONTINUED | COMMUNITY
Start: 2021-04-22 | End: 2023-10-09

## 2023-10-09 RX ORDER — FENTANYL 100 UG/H
100 PATCH, EXTENDED RELEASE TRANSDERMAL
Refills: 0 | Status: ACTIVE | COMMUNITY

## 2023-10-09 RX ORDER — DULAGLUTIDE 1.5 MG/.5ML
1.5 INJECTION, SOLUTION SUBCUTANEOUS
Refills: 0 | Status: ACTIVE | COMMUNITY

## 2023-10-09 RX ORDER — INSULIN ASPART 100 [IU]/ML
100 INJECTION, SOLUTION INTRAVENOUS; SUBCUTANEOUS
Refills: 0 | Status: ACTIVE | COMMUNITY

## 2023-10-09 RX ORDER — NICOTINE POLACRILEX 2 MG/1
2 LOZENGE ORAL
Refills: 0 | Status: ACTIVE | COMMUNITY

## 2023-10-09 RX ORDER — ROSUVASTATIN CALCIUM 40 MG/1
40 TABLET, FILM COATED ORAL
Refills: 0 | Status: ACTIVE | COMMUNITY

## 2023-10-13 LAB
ANION GAP SERPL CALC-SCNC: 12 MMOL/L
BASOPHILS # BLD AUTO: 0.02 K/UL
BASOPHILS NFR BLD AUTO: 0.4 %
BUN SERPL-MCNC: 26 MG/DL
CALCIUM SERPL-MCNC: 9.7 MG/DL
CHLORIDE SERPL-SCNC: 105 MMOL/L
CHOLEST SERPL-MCNC: 150 MG/DL
CO2 SERPL-SCNC: 24 MMOL/L
CREAT SERPL-MCNC: 1.45 MG/DL
EGFR: 40 ML/MIN/1.73M2
EOSINOPHIL # BLD AUTO: 0.14 K/UL
EOSINOPHIL NFR BLD AUTO: 2.7 %
GLUCOSE SERPL-MCNC: 133 MG/DL
HCT VFR BLD CALC: 28.1 %
HDLC SERPL-MCNC: 67 MG/DL
HGB BLD-MCNC: 9 G/DL
IMM GRANULOCYTES NFR BLD AUTO: 0.4 %
LDLC SERPL CALC-MCNC: 61 MG/DL
LYMPHOCYTES # BLD AUTO: 1.9 K/UL
LYMPHOCYTES NFR BLD AUTO: 36.2 %
MAN DIFF?: NORMAL
MCHC RBC-ENTMCNC: 32 GM/DL
MCHC RBC-ENTMCNC: 33.1 PG
MCV RBC AUTO: 103.3 FL
MONOCYTES # BLD AUTO: 0.2 K/UL
MONOCYTES NFR BLD AUTO: 3.8 %
NEUTROPHILS # BLD AUTO: 2.97 K/UL
NEUTROPHILS NFR BLD AUTO: 56.5 %
NONHDLC SERPL-MCNC: 84 MG/DL
PLATELET # BLD AUTO: 230 K/UL
POTASSIUM SERPL-SCNC: 4.1 MMOL/L
RBC # BLD: 2.72 M/UL
RBC # FLD: 16.8 %
SODIUM SERPL-SCNC: 141 MMOL/L
TRIGL SERPL-MCNC: 133 MG/DL
WBC # FLD AUTO: 5.25 K/UL

## 2023-11-30 ENCOUNTER — APPOINTMENT (OUTPATIENT)
Dept: NEUROSURGERY | Facility: CLINIC | Age: 63
End: 2023-11-30
Payer: MEDICARE

## 2023-11-30 DIAGNOSIS — M43.10 SPONDYLOLISTHESIS, SITE UNSPECIFIED: ICD-10-CM

## 2023-11-30 DIAGNOSIS — M48.062 SPINAL STENOSIS, LUMBAR REGION WITH NEUROGENIC CLAUDICATION: ICD-10-CM

## 2023-11-30 PROCEDURE — 99213 OFFICE O/P EST LOW 20 MIN: CPT

## 2023-12-13 ENCOUNTER — NON-APPOINTMENT (OUTPATIENT)
Age: 63
End: 2023-12-13

## 2024-01-04 ENCOUNTER — APPOINTMENT (OUTPATIENT)
Dept: NEUROSURGERY | Facility: CLINIC | Age: 64
End: 2024-01-04

## 2024-01-08 ENCOUNTER — APPOINTMENT (OUTPATIENT)
Dept: NEUROSURGERY | Facility: CLINIC | Age: 64
End: 2024-01-08
Payer: MEDICARE

## 2024-01-08 DIAGNOSIS — M54.16 RADICULOPATHY, LUMBAR REGION: ICD-10-CM

## 2024-01-08 DIAGNOSIS — Z96.641 PRESENCE OF RIGHT ARTIFICIAL HIP JOINT: ICD-10-CM

## 2024-01-08 DIAGNOSIS — Z98.1 ARTHRODESIS STATUS: ICD-10-CM

## 2024-01-08 DIAGNOSIS — G95.20 UNSPECIFIED CORD COMPRESSION: ICD-10-CM

## 2024-01-08 PROCEDURE — 99442: CPT

## 2024-04-19 ENCOUNTER — APPOINTMENT (OUTPATIENT)
Dept: ULTRASOUND IMAGING | Facility: CLINIC | Age: 64
End: 2024-04-19

## 2024-04-26 ENCOUNTER — APPOINTMENT (OUTPATIENT)
Dept: MAMMOGRAPHY | Facility: CLINIC | Age: 64
End: 2024-04-26
Payer: MEDICARE

## 2024-04-26 ENCOUNTER — APPOINTMENT (OUTPATIENT)
Dept: RADIOLOGY | Facility: CLINIC | Age: 64
End: 2024-04-26

## 2024-04-26 PROCEDURE — 77063 BREAST TOMOSYNTHESIS BI: CPT

## 2024-04-26 PROCEDURE — 77080 DXA BONE DENSITY AXIAL: CPT

## 2024-04-26 PROCEDURE — 77067 SCR MAMMO BI INCL CAD: CPT

## 2024-09-19 ENCOUNTER — APPOINTMENT (OUTPATIENT)
Dept: NEUROSURGERY | Facility: CLINIC | Age: 64
End: 2024-09-19

## 2024-11-14 NOTE — PHYSICAL EXAM
[General Appearance - Alert] : alert [General Appearance - Well Developed] : well developed [General Appearance - Well Nourished] : well nourished [General Appearance - In No Acute Distress] : in no acute distress [General Appearance - Well-Appearing] : healthy appearing [Transverse] : transverse [Clean] : clean [Dry] : dry [Intact] : intact [Sutures Intact] : closed with intact sutures [Well-Healed] : well-healed [Place] : oriented to place [Time] : oriented to time [Person] : oriented to person [Involuntary Movements] : no involuntary movements were seen [No Muscle Atrophy] : normal bulk in all four extremities [Limited Balance] : the patient's balance was impaired [Motor Tone] : muscle tone was normal in all four extremities [Incision CDI] : was clean, dry and intact [Right Trapezius Muscle] : right trapezius muscle [Left Trapezius Muscle] : left trapezius muscle [Muscle Spasms, Bilateral] : bilateral muscle spasms [FreeTextEntry1] : anterior neck [FreeTextEntry6] : +good  strength bilaterally - - -

## 2025-04-23 ENCOUNTER — TRANSCRIPTION ENCOUNTER (OUTPATIENT)
Age: 65
End: 2025-04-23

## 2025-06-14 ENCOUNTER — NON-APPOINTMENT (OUTPATIENT)
Age: 65
End: 2025-06-14

## 2025-06-16 ENCOUNTER — APPOINTMENT (OUTPATIENT)
Dept: NEUROSURGERY | Facility: CLINIC | Age: 65
End: 2025-06-16

## 2025-06-17 ENCOUNTER — APPOINTMENT (OUTPATIENT)
Dept: NEUROSURGERY | Facility: CLINIC | Age: 65
End: 2025-06-17
Payer: MEDICARE

## 2025-06-17 VITALS — BODY MASS INDEX: 26.68 KG/M2 | WEIGHT: 170 LBS | HEIGHT: 67 IN

## 2025-06-17 PROCEDURE — 99214 OFFICE O/P EST MOD 30 MIN: CPT
